# Patient Record
Sex: FEMALE | Race: WHITE | Employment: FULL TIME | URBAN - METROPOLITAN AREA
[De-identification: names, ages, dates, MRNs, and addresses within clinical notes are randomized per-mention and may not be internally consistent; named-entity substitution may affect disease eponyms.]

---

## 2022-08-12 ENCOUNTER — APPOINTMENT (OUTPATIENT)
Dept: CT IMAGING | Age: 47
End: 2022-08-12
Payer: COMMERCIAL

## 2022-08-12 ENCOUNTER — HOSPITAL ENCOUNTER (EMERGENCY)
Age: 47
Discharge: HOME | End: 2022-08-12
Attending: EMERGENCY MEDICINE
Payer: COMMERCIAL

## 2022-08-12 VITALS
RESPIRATION RATE: 17 BRPM | BODY MASS INDEX: 28.04 KG/M2 | HEART RATE: 84 BPM | DIASTOLIC BLOOD PRESSURE: 79 MMHG | SYSTOLIC BLOOD PRESSURE: 117 MMHG | OXYGEN SATURATION: 97 % | TEMPERATURE: 98 F | WEIGHT: 185 LBS | HEIGHT: 68 IN

## 2022-08-12 DIAGNOSIS — R51.9 ACUTE NONINTRACTABLE HEADACHE, UNSPECIFIED HEADACHE TYPE: Primary | ICD-10-CM

## 2022-08-12 LAB
ABSOLUTE EOS #: 0 K/UL (ref 0–0.4)
ABSOLUTE LYMPH #: 1.9 K/UL (ref 1–4.8)
ABSOLUTE MONO #: 0.7 K/UL (ref 0.1–1.3)
ANION GAP SERPL CALCULATED.3IONS-SCNC: 9 MMOL/L (ref 9–17)
BASOPHILS # BLD: 0 % (ref 0–2)
BASOPHILS ABSOLUTE: 0 K/UL (ref 0–0.2)
BILIRUBIN URINE: NEGATIVE
BUN BLDV-MCNC: 15 MG/DL (ref 6–20)
CALCIUM SERPL-MCNC: 9.5 MG/DL (ref 8.6–10.4)
CHLORIDE BLD-SCNC: 103 MMOL/L (ref 98–107)
CO2: 26 MMOL/L (ref 20–31)
COLOR: YELLOW
COMMENT UA: NORMAL
CREAT SERPL-MCNC: 0.67 MG/DL (ref 0.5–0.9)
EOSINOPHILS RELATIVE PERCENT: 0 % (ref 0–4)
GFR AFRICAN AMERICAN: >60 ML/MIN
GFR NON-AFRICAN AMERICAN: >60 ML/MIN
GFR SERPL CREATININE-BSD FRML MDRD: ABNORMAL ML/MIN/{1.73_M2}
GLUCOSE BLD-MCNC: 128 MG/DL (ref 70–99)
GLUCOSE URINE: NEGATIVE
HCT VFR BLD CALC: 37.4 % (ref 36–46)
HEMOGLOBIN: 12.6 G/DL (ref 12–16)
KETONES, URINE: NEGATIVE
LEUKOCYTE ESTERASE, URINE: NEGATIVE
LYMPHOCYTES # BLD: 17 % (ref 24–44)
MAGNESIUM: 2 MG/DL (ref 1.6–2.6)
MCH RBC QN AUTO: 32.1 PG (ref 26–34)
MCHC RBC AUTO-ENTMCNC: 33.8 G/DL (ref 31–37)
MCV RBC AUTO: 95.1 FL (ref 80–100)
MONOCYTES # BLD: 6 % (ref 1–7)
NITRITE, URINE: NEGATIVE
PDW BLD-RTO: 14.7 % (ref 11.5–14.9)
PH UA: 6 (ref 5–8)
PLATELET # BLD: 225 K/UL (ref 150–450)
PMV BLD AUTO: 7.3 FL (ref 6–12)
POTASSIUM SERPL-SCNC: 4.1 MMOL/L (ref 3.7–5.3)
PROTEIN UA: NEGATIVE
RBC # BLD: 3.93 M/UL (ref 4–5.2)
SEG NEUTROPHILS: 77 % (ref 36–66)
SEGMENTED NEUTROPHILS ABSOLUTE COUNT: 9 K/UL (ref 1.3–9.1)
SODIUM BLD-SCNC: 138 MMOL/L (ref 135–144)
SPECIFIC GRAVITY UA: 1.01 (ref 1–1.03)
TURBIDITY: CLEAR
URINE HGB: NEGATIVE
UROBILINOGEN, URINE: NORMAL
WBC # BLD: 11.7 K/UL (ref 3.5–11)

## 2022-08-12 PROCEDURE — 2580000003 HC RX 258: Performed by: EMERGENCY MEDICINE

## 2022-08-12 PROCEDURE — 6360000002 HC RX W HCPCS: Performed by: EMERGENCY MEDICINE

## 2022-08-12 PROCEDURE — 36415 COLL VENOUS BLD VENIPUNCTURE: CPT

## 2022-08-12 PROCEDURE — 81003 URINALYSIS AUTO W/O SCOPE: CPT

## 2022-08-12 PROCEDURE — 83735 ASSAY OF MAGNESIUM: CPT

## 2022-08-12 PROCEDURE — 96374 THER/PROPH/DIAG INJ IV PUSH: CPT

## 2022-08-12 PROCEDURE — 99284 EMERGENCY DEPT VISIT MOD MDM: CPT

## 2022-08-12 PROCEDURE — 80048 BASIC METABOLIC PNL TOTAL CA: CPT

## 2022-08-12 PROCEDURE — 85025 COMPLETE CBC W/AUTO DIFF WBC: CPT

## 2022-08-12 PROCEDURE — 70450 CT HEAD/BRAIN W/O DYE: CPT

## 2022-08-12 RX ORDER — METOCLOPRAMIDE HYDROCHLORIDE 5 MG/ML
10 INJECTION INTRAMUSCULAR; INTRAVENOUS ONCE
Status: COMPLETED | OUTPATIENT
Start: 2022-08-12 | End: 2022-08-12

## 2022-08-12 RX ORDER — DIPHENHYDRAMINE HYDROCHLORIDE 50 MG/ML
25 INJECTION INTRAMUSCULAR; INTRAVENOUS EVERY 6 HOURS PRN
Status: DISCONTINUED | OUTPATIENT
Start: 2022-08-12 | End: 2022-08-12 | Stop reason: HOSPADM

## 2022-08-12 RX ORDER — 0.9 % SODIUM CHLORIDE 0.9 %
1000 INTRAVENOUS SOLUTION INTRAVENOUS ONCE
Status: COMPLETED | OUTPATIENT
Start: 2022-08-12 | End: 2022-08-12

## 2022-08-12 RX ADMIN — SODIUM CHLORIDE 1000 ML: 9 INJECTION, SOLUTION INTRAVENOUS at 13:01

## 2022-08-12 RX ADMIN — METOCLOPRAMIDE HYDROCHLORIDE 10 MG: 5 INJECTION INTRAMUSCULAR; INTRAVENOUS at 13:10

## 2022-08-12 ASSESSMENT — ENCOUNTER SYMPTOMS
VOMITING: 0
RHINORRHEA: 0
EYE REDNESS: 0
SINUS PRESSURE: 0
COLOR CHANGE: 0
BACK PAIN: 0
WHEEZING: 0
CHEST TIGHTNESS: 0
FACIAL SWELLING: 0
COUGH: 0
SHORTNESS OF BREATH: 0
TROUBLE SWALLOWING: 0
SORE THROAT: 0
NAUSEA: 0
DIARRHEA: 0
ABDOMINAL PAIN: 0
EYE PAIN: 0
EYE DISCHARGE: 0
CONSTIPATION: 0
BLOOD IN STOOL: 0

## 2022-08-12 ASSESSMENT — PAIN - FUNCTIONAL ASSESSMENT
PAIN_FUNCTIONAL_ASSESSMENT: NONE - DENIES PAIN
PAIN_FUNCTIONAL_ASSESSMENT: NONE - DENIES PAIN

## 2022-08-12 NOTE — ED PROVIDER NOTES
Negative for abdominal pain, blood in stool, constipation, diarrhea, nausea and vomiting. Genitourinary:  Negative for difficulty urinating, dysuria, flank pain, frequency, genital sores and hematuria. Musculoskeletal:  Negative for arthralgias, back pain, gait problem, joint swelling, myalgias and neck pain. Skin:  Positive for wound. Negative for color change, pallor and rash. Neurological:  Positive for headaches. Negative for dizziness, tremors, seizures, syncope, speech difficulty, weakness and numbness. Psychiatric/Behavioral:  Negative for confusion, decreased concentration, hallucinations, self-injury, sleep disturbance and suicidal ideas. PAST MEDICAL HISTORY   No past medical history on file. SURGICAL HISTORY     No past surgical history on file. CURRENT MEDICATIONS       Previous Medications    No medications on file       ALLERGIES     has No Known Allergies. SOCIAL HISTORY          PHYSICAL EXAM     INITIAL VITALS: /79   Pulse 84   Temp 98 °F (36.7 °C) (Oral)   Resp 17   Ht 5' 8\" (1.727 m)   Wt 185 lb (83.9 kg)   SpO2 97%   BMI 28.13 kg/m²      Physical Exam  Vitals and nursing note reviewed. Constitutional:       General: She is not in acute distress. Appearance: She is well-developed. She is not diaphoretic. HENT:      Head: Normocephalic and atraumatic. Comments: Incision behind the left ear is swollen and edematous but no erythema no discharge  Eyes:      General: No scleral icterus. Right eye: No discharge. Left eye: No discharge. Conjunctiva/sclera: Conjunctivae normal.      Pupils: Pupils are equal, round, and reactive to light. Cardiovascular:      Rate and Rhythm: Normal rate and regular rhythm. Heart sounds: Normal heart sounds. No murmur heard. No friction rub. No gallop. Pulmonary:      Effort: Pulmonary effort is normal. No respiratory distress. Breath sounds: Normal breath sounds. No wheezing or rales. Chest:      Chest wall: No tenderness. Abdominal:      General: Bowel sounds are normal. There is no distension. Palpations: Abdomen is soft. There is no mass. Tenderness: There is no abdominal tenderness. There is no guarding or rebound. Musculoskeletal:         General: No tenderness. Normal range of motion. Skin:     General: Skin is warm and dry. Coloration: Skin is not pale. Findings: No erythema or rash. Neurological:      Mental Status: She is alert and oriented to person, place, and time. Cranial Nerves: No cranial nerve deficit. Sensory: No sensory deficit. Motor: No weakness or abnormal muscle tone. Coordination: Coordination normal.      Deep Tendon Reflexes: Reflexes normal.   Psychiatric:         Behavior: Behavior normal.         Thought Content: Thought content normal.         Judgment: Judgment normal.       DIAGNOSTIC RESULTS     RADIOLOGY:All plain film, CT,MRI, and formal ultrasound images (except ED bedside ultrasound) are read by the radiologist and the interpretations are directly viewed by the emergency physician. CT HEAD WO CONTRAST    Result Date: 8/12/2022  EXAMINATION: CT OF THE HEAD WITHOUT CONTRAST  8/12/2022 1:35 pm TECHNIQUE: CT of the head was performed without the administration of intravenous contrast. Automated exposure control, iterative reconstruction, and/or weight based adjustment of the mA/kV was utilized to reduce the radiation dose to as low as reasonably achievable. COMPARISON: None.  HISTORY: ORDERING SYSTEM PROVIDED HISTORY: Post op HA and incision swelling TECHNOLOGIST PROVIDED HISTORY: Post op HA and incision swelling Decision Support Exception - unselect if not a suspected or confirmed emergency medical condition->Emergency Medical Condition (MA) Is the patient pregnant?->No Reason for Exam: Post op HA and incision swelling Additional signs and symptoms: post left oracle swelling x1 month post op craniotomy, elevated BP Relevant Medical/Surgical History: hx of benign brain tumor FINDINGS: BRAIN/VENTRICLES: There is no acute intracranial hemorrhage, mass effect or midline shift. There is fluid seen adjacent to the left cerebellar hemisphere in the posterior fossa. The gray-white differentiation is maintained without evidence of an acute infarct. There is no evidence of hydrocephalus. ORBITS: The visualized portion of the orbits demonstrate no acute abnormality. SINUSES: The visualized paranasal sinuses and mastoid air cells demonstrate no acute abnormality. SOFT TISSUES/SKULL:  Fluid attenuation seen in the posterior soft tissues overlying the left occipital craniotomy site. The collection appears to measure proximally 4 cm in greatest diameter. Fluid seen adjacent to the left cerebellar hemisphere in the left posterior fossa underlying left occipital craniotomy. In the soft tissues overlying the left occipital craniotomy there is a fluid collection measuring approximately 4 cm in greatest diameter. An infected fluid collection could give this appearance. LABS: All lab results were reviewed by myself, and all abnormals are listed below. Labs Reviewed   BASIC METABOLIC PANEL - Abnormal; Notable for the following components:       Result Value    Glucose 128 (*)     All other components within normal limits   CBC WITH AUTO DIFFERENTIAL - Abnormal; Notable for the following components:    WBC 11.7 (*)     RBC 3.93 (*)     Seg Neutrophils 77 (*)     Lymphocytes 17 (*)     All other components within normal limits   URINALYSIS WITH REFLEX TO CULTURE   MAGNESIUM         MEDICAL DECISION MAKING:     I believe that there is just some edema around the incision though I do not see any signs of fluctuance. We will get a CT scan of the head both for the headaches since its postoperative and also to take a look at that area of the scalp.   Also do some basic blood work and get her some fluids and something to help with headache. EMERGENCY DEPARTMENT COURSE:   Vitals:    Vitals:    08/12/22 1225 08/12/22 1344 08/12/22 1401   BP: 133/71 120/81 117/79   Pulse: 83 84    Resp: 18 17    Temp: 98 °F (36.7 °C)     TempSrc: Oral     SpO2: 98% 98% 97%   Weight: 185 lb (83.9 kg)     Height: 5' 8\" (1.727 m)         The patient was given the following medications while in the emergency department:  Orders Placed This Encounter   Medications    0.9 % sodium chloride bolus    metoclopramide (REGLAN) injection 10 mg    diphenhydrAMINE (BENADRYL) injection 25 mg       -------------------------  3:54 PM EDT  Patient was updated on results. I did discuss the case with the neurosurgery resident who is familiar with this patient and they think most likely the fluid collection is just a cephalomeningocele and they do not believe that this is related to infection since the patient is nontoxic and vital signs are normal.  They recommend following up next week in the office which she is going to have them set up. Patient is okay with this plan. CONSULTS:  None    PROCEDURES:  None    FINAL IMPRESSION      1.  Acute nonintractable headache, unspecified headache type          DISPOSITION/PLAN   DISPOSITION Decision To Discharge 08/12/2022 03:53:31 PM      PATIENT REFERREDTO:  [address] [place] [address] Eliana Swanson Bhavna 1999  [phone]    In 1 week      [facility] ED  [place] [address]  [phone]    If symptoms worsen    DISCHARGEMEDICATIONS:  New Prescriptions    No medications on file       (Please note that portions of this note were completed with a voice recognition program.  Efforts were made to edit thedictations but occasionally words are mis-transcribed.)    Ashlee Hurd MD  Attending Emergency Physician                        Ashlee Hurd MD  08/12/22 [id number]

## 2022-08-12 NOTE — ED TRIAGE NOTES
Mode of arrival (squad #, walk in, police, etc) : walk-in        Chief complaint(s): post-op swelling, headache        Arrival Note (brief scenario, treatment PTA, etc). : Patient reports craniotomy for tumor removal x1 month ago. Patient reports increased swelling at the procedure site x1 week. Patient reports a slight headache and pain radiating into the left side of the neck. Patient reports having left sided facial paralysis and hearing loss of the left ear post procedure. C= \"Have you ever felt that you should Cut down on your drinking? \"  No  A= \"Have people Annoyed you by criticizing your drinking? \"  No  G= \"Have you ever felt bad or Guilty about your drinking? \"  No  E= \"Have you ever had a drink as an Eye-opener first thing in the morning to steady your nerves or to help a hangover? \"  No      Deferred []      Reason for deferring: N/A    *If yes to two or more: probable alcohol abuse. *

## 2023-06-30 ENCOUNTER — HOSPITAL ENCOUNTER (OUTPATIENT)
Dept: DATA CONVERSION | Facility: HOSPITAL | Age: 48
End: 2023-06-30
Attending: OTOLARYNGOLOGY | Admitting: OTOLARYNGOLOGY
Payer: COMMERCIAL
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
## 2023-06-30 DIAGNOSIS — G51.0 BELL'S PALSY: ICD-10-CM

## 2023-06-30 DIAGNOSIS — K13.1 CHEEK AND LIP BITING: ICD-10-CM

## 2023-06-30 DIAGNOSIS — H57.813 BROW PTOSIS, BILATERAL: ICD-10-CM

## 2023-06-30 DIAGNOSIS — H02.834 DERMATOCHALASIS OF LEFT UPPER EYELID: ICD-10-CM

## 2023-06-30 DIAGNOSIS — G24.4 IDIOPATHIC OROFACIAL DYSTONIA: ICD-10-CM

## 2023-06-30 DIAGNOSIS — E66.9 OBESITY, UNSPECIFIED: ICD-10-CM

## 2023-06-30 DIAGNOSIS — H02.831 DERMATOCHALASIS OF RIGHT UPPER EYELID: ICD-10-CM

## 2023-06-30 DIAGNOSIS — K21.9 GASTRO-ESOPHAGEAL REFLUX DISEASE WITHOUT ESOPHAGITIS: ICD-10-CM

## 2023-06-30 LAB
ABO GROUP (TYPE) IN BLOOD: NORMAL
ANTIBODY SCREEN: NORMAL
PATIENT TEMPERATURE: 37 DEGREES C
POCT ANION GAP, ARTERIAL: 10 MMOL/L (ref 10–25)
POCT BASE EXCESS, ARTERIAL: -1.8 MMOL/L (ref -2–3)
POCT CHLORIDE, ARTERIAL: 107 MMOL/L (ref 98–107)
POCT GLUCOSE, ARTERIAL: 160 MG/DL (ref 74–99)
POCT HCO3, ARTERIAL: 23.1 MMOL/L (ref 22–26)
POCT HEMATOCRIT, ARTERIAL: 31 % (ref 36–46)
POCT HEMOGLOBIN, ARTERIAL: 10.4 G/DL (ref 12–16)
POCT IONIZED CALCIUM, ARTERIAL: 1.05 MMOL/L (ref 1.1–1.33)
POCT LACTATE, ARTERIAL: 1.5 MMOL/L (ref 0.4–2)
POCT OXY HEMOGLOBIN, ARTERIAL: 96.4 % (ref 94–98)
POCT PCO2, ARTERIAL: 39 MMHG (ref 38–42)
POCT PH, ARTERIAL: 7.38 (ref 7.38–7.42)
POCT PO2, ARTERIAL: 133 MMHG (ref 85–95)
POCT POTASSIUM, ARTERIAL: 3.6 MMOL/L (ref 3.5–5.3)
POCT SO2, ARTERIAL: 100 % (ref 94–100)
POCT SODIUM, ARTERIAL: 136 MMOL/L (ref 136–145)
RH FACTOR: NORMAL

## 2023-09-29 VITALS
RESPIRATION RATE: 16 BRPM | WEIGHT: 182.1 LBS | HEIGHT: 67 IN | TEMPERATURE: 97.5 F | SYSTOLIC BLOOD PRESSURE: 120 MMHG | HEART RATE: 68 BPM | BODY MASS INDEX: 28.58 KG/M2 | DIASTOLIC BLOOD PRESSURE: 71 MMHG

## 2023-09-30 NOTE — H&P
History of Present Illness:   Pregnant/Lactating:  ·  Are You Pregnant no   ·  Are You Currently Breastfeeding no     History Present Illness:  Reason for surgery: facial paralysis, brow ptosis   HPI:    HPI: No significant changes to the medical history since last H/P    PMH: reviewed in chart   Fam Hx: negative for bleeding disorders  Social Hx: Reviewed in EMR  Allergies: NKDA  ROS: negative except as above in HPI  Physical Exam:  Gen- NAD  Resp- nonlabored on RA, symmetric chest rise  Head/Face- NCAT, no masses or lesions  Eyes- EOMI, clear sclera  Ears- normal external ears, no gross lesions of EACs  Nose- anterior nares clear, no bleeding or drainage  Mouth- lips without lesions, no excessive drooling  Neuro- alert and interactive    Medications, imaging and pertinent labs reviewed in EMR    A/P  Proceed with planned surgery.    Allergies:        Allergies:  ·  No Known Allergies :     Home Medication Review:   Home Medications Reviewed: yes     Impression/Procedure:   ·  Impression and Planned Procedure: facial paralysis, brow ptosis  blepharoplasty, brow lift, digastric muscle transfer with CFNG       ERAS (Enhanced Recovery After Surgery):  ·  ERAS Patient: no       Vital Signs:  Temperature C: 36.4 degrees C   Temperature F: 97.5 degrees F   Heart Rate: 68 beats per minute   Respiratory Rate: 16 breath per minute   Blood Pressure Systolic: 120 mm/Hg   Blood Pressure Diastolic: 71 mm/Hg     Physical Exam by System:    Respiratory/Thorax: Patent airways, normal breath  sounds with good chest expansion, thorax symmetric   Cardiovascular: Regular, rate and rhythm, 2+ equal  pulses of the extremities     Consent:   COVID-19 Consent:  ·  COVID-19 Risk Consent Surgeon has reviewed key risks related to the risk of radha COVID-19 and if they contract COVID-19 what the risks are.     Attestation:   Note Completion:  I am a:  Resident/Fellow   Attending Attestation I saw and evaluated the patient.  I  personally obtained the key and critical portions of the history and physical exam or was physically present for key and  critical portions performed by the resident/fellow. I reviewed the resident/fellow?s documentation and discussed the patient with the resident/fellow.  I agree with the resident/fellow?s medical decision making as documented in the note.     I personally evaluated the patient on 30-Jun-2023         Electronic Signatures:  Wilfred Muñoz)  (Signed 30-Jun-2023 15:00)   Authored: Physical Exam, Note Completion   Co-Signer: History of Present Illness, Allergies, Home Medication Review, Impression/Procedure, ERAS, Physical Exam, Consent, Note Completion  Jace Perez (Resident))  (Signed 30-Jun-2023 06:34)   Authored: History of Present Illness, Allergies, Home  Medication Review, Impression/Procedure, ERAS, Physical Exam, Consent, Note Completion      Last Updated: 30-Jun-2023 15:00 by Wilfred Muñoz)

## 2023-10-02 NOTE — OP NOTE
PROCEDURE DETAILS    Preoperative Diagnosis:  facial paralysis  oral competency, lip biting  oral dyskinesia  speech dysarthria  brow ptosis bilaterally  dermatochalasis  Postoperative Diagnosis:  same  Surgeon: Wilfred Muñoz  Resident/Fellow/Other Assistant: None of these were associated with this case    Procedure:  1.  BILATERAL BROW LIFT, BLEPHAROPLASTY, DIGASTRIC TRANSFER, SECOND STAGE CROSS FACE NERVE GRAFT    Anesthesia: David Castaneda  Estimated Blood Loss: 10 ml  Findings: digastric muscle transfer  2nd stage CFNG  browlift + bleph        Operative Report:   INDICATIONS:   Patient is a pleasant female with left facial paralysis.  Options for reanimation were discussed including static and dynamic procedures.  The risks of digastirc muscle transfer for oral kinesia restoration were discussed including bleeding, infection,  partial or complete flap failure, return to the operating room, poor cosmesis, implant exposure or extrusion or infection, wound infection, and anesthetic risk were fully explained as well as the benefits and alternatives to the procedure.  The patient  expressed understanding and wishes to proceed.  Informed consent signed.     PROCEDURE DETAILS:  The patient was brought to the operating room and placed supine on the table.   Identity was confirmed.  General anesthesia was induced and patient was nasotracheally intubated with a cuffed endotracheal tube. A time-out was performed.  The operating  table was rotated 90 degrees. SCD boots were placed and the arms protected and tucked at the sides.     The face and neck were prepped and draped in the usual sterile fashion. The eyes were protected after basic saline solution irrigation and placement of steri-strips.  Pre-operative markings of facial landmarks were created in the pre-op area prior to  starting the procedure.    We began with the identification of the diagstirc muscle. A 2.5 cm horizontal neck incision was made with  dissection carried through previous scar and cervical fascia to identify the digastric muscle belly. The previous cross face nerve graft was identified  as well for second stage nerve transfer and neurotization of the digastric muscle upon transfer. This nerve was dissected distally and proximally for transposition of the nerve. The digastric muscle was circumferentially dissected with the proximal portion  of the muscle belly followed to the tendonous sheath. The tendon was released from the hyoid bone and lifted along the deep portion of the muscle from posterior to anterior. The neurovascular pedicle was identified and preserved. The nerve to the digastric  was stimulated revealing favorable movement of the digastric muscle belly suitable for transposition. Upon adequate dissection and mobilization of the digastric muscle belly, the nerve to the digastric was lysed and the site was prepared for neurotization  and coaptation.    The operative microscope for microsurgical technique was used for coaptation of the digastric nerve with the cross face nerve graft.  Excess viable cross face nerve graft was splayed open with opening of the imani and epineurium and fascicle isolation  for direct neurotization into the muscle belly. These were brought into end-to-end alignment such that there was no tension on the anastomosis and secured with several 8-0 sutures. The fascicles were embedded into the muscle belly. Neuroshield chitosan  membrane was wrapped around to the coaptation site for allograft conduit.  A tunnel was created from the mucosal lip to the neck wound. At this point, the distal end of the flap was inset at the orbicular oris with PDS and monocryl sutures. Bipolar hemostasis  was achieved and a JAIRO drain was placed into the wound bed. The incisions were closed at the lip and neck with monocryl and gut suture. Ointment was applied at these sites.    Attention was turned to the bilateral upper eyelid for  blepharoplasty. The following was performed bilaterally. Excess skin marked pre-operatively with the patient awake was sharply cut and dissected and subsequently discarded. Deep to this skin, a slit  of orbicular oculi was excised. Hemostasis was achieved with bipolar cautery. The medial fat compartment was identified and dissected, the fat was exposed and released for resection. A small portion of fat was resected using bipolar cautery. The incisions  were then closed with fast gut suture.    Next, we proceeded with a bilateral open brow approach for bilateral brow ptosis. The forehead was injected with 1% lidocaine with epinephrine in  a 1:100,000 dilution.  An incision was created using a 15-blade scalpel through the skin of the brow which was hidden slightly behind the hairline. A supra-galeal plane of dissection was then used to elevate the skin of the forehead down to a level just  below the superior orbital rim.  The brow was lifted to the desired height using both PDS. Hemostasis was obtained and the skin closed with deep monocryl sutures and a running nylon suture.  A 1.2 cm of redundant skin was excised prior to closure.   The forehead was then protected with a xeroform dressing and the head was wrapped with a kerlex dressing. Care was taken to allow two fingers to be passed under the wrap in order to prevent tissue necrosis of the forehead skin.                        Attestation:   Note Completion:  Attending Attestation I was present for the entire procedure         Electronic Signatures:  Wilfred Muñoz)  (Signed 03-Jul-2023 22:55)   Authored: Post-Operative Note, Chart Review, Note Completion      Last Updated: 03-Jul-2023 22:55 by Wilfred Muñoz)

## 2024-05-09 ENCOUNTER — APPOINTMENT (OUTPATIENT)
Dept: OTOLARYNGOLOGY | Facility: CLINIC | Age: 49
End: 2024-05-09
Payer: COMMERCIAL

## 2024-08-15 ENCOUNTER — APPOINTMENT (OUTPATIENT)
Dept: OTOLARYNGOLOGY | Facility: CLINIC | Age: 49
End: 2024-08-15
Payer: COMMERCIAL

## 2024-08-15 ENCOUNTER — OFFICE VISIT (OUTPATIENT)
Dept: OTOLARYNGOLOGY | Facility: CLINIC | Age: 49
End: 2024-08-15
Payer: COMMERCIAL

## 2024-08-15 VITALS — BODY MASS INDEX: 28.26 KG/M2 | WEIGHT: 186.5 LBS | HEIGHT: 68 IN

## 2024-08-15 DIAGNOSIS — G24.5 BLEPHAROSPASM: ICD-10-CM

## 2024-08-15 DIAGNOSIS — G51.9 ABNORMALITY OF FACIAL NERVE: Primary | ICD-10-CM

## 2024-08-15 DIAGNOSIS — R25.8 SYNKINESIA: ICD-10-CM

## 2024-08-15 PROCEDURE — 99214 OFFICE O/P EST MOD 30 MIN: CPT | Performed by: OTOLARYNGOLOGY

## 2024-08-15 PROCEDURE — 3008F BODY MASS INDEX DOCD: CPT | Performed by: OTOLARYNGOLOGY

## 2024-08-15 RX ORDER — PANTOPRAZOLE SODIUM 40 MG/1
40 TABLET, DELAYED RELEASE ORAL DAILY
COMMUNITY

## 2024-08-15 RX ORDER — SERTRALINE HYDROCHLORIDE 50 MG/1
50 TABLET, FILM COATED ORAL DAILY
COMMUNITY

## 2024-08-15 ASSESSMENT — PAIN SCALES - GENERAL: PAINLEVEL: 0-NO PAIN

## 2024-08-15 NOTE — PROGRESS NOTES
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     Complains of scalp sensitivity and itchiness, TTP of left occipital scalp.   Complains of left sided lip / trouble eating, left eyelid and cheek tightness.     Last botox was done on 9/15/23; did not return because wanted to see what her face would look like without the botox.  Was still trying to grasp that her face does not look the same.     Presents today for FUV     Assessment: This patient has left incomplete facial paralysis. There is a profound physical deformity, with asymmetry of the face. Function is compromised with oral incompetence, air escape, difficulty communicating with others, eye irritation, and visual field obstruction.     Plan: We discussed options for treatment, including both injectable treatments, facial retraining, and surgical interventions. Each of these is is medically necessary.     Treatment plan:     chemodenervation to continue,  we discussed and encouraged more mental health support and wellbeing  We also discussed possible cross face nerve graft and gracilis muscle neurovascular flap to improve dynamic movement on the left     Today, I was in direct face-to-face consultation with the patient, of which greater than 50% of the time was spent discussing the diagnoses, treatment plan, counseling, and care coordination. The patient and/or caregiver voiced understanding of our conversation and all questions were satisfactorily answered.    Total time for this visit: 30 min     Recall at previous visits:  continues to have symptoms related to synkinesia and dyskinesia with facial tightness and spasms  here for follow up after chemodenervation      7.14.23  s/p bilateral  brow lift, blepharoplasty, left digastric muscle transfer     left neck soft, no fluctuance  incisions c/d/i  sutures at brow removed     continue ointment to incisions  RTC 2 months     IH 5.26.23 improved facial symmetry still bothered by lack of lower lip retraction on the left     IH 3.24.23 Returns for post operative monitoring and adjunctive trial of chemodenervation. doing well. results of the chemodenervation were reviewed. she feels symmetry is improved. would like improved brow symmetry and less brow ptosis, left lower lip with asymmetry still that is bothersome to speech     IH 2.27.23 here for stape removal     IH 2.21.23     s/p left selective neuromyectomy, CFNG for possible digastric transfer     pt with improved excursion and oral commissure position  staples intact  brow in more appropriate position     consideration for blepharoplasty, brow lift given improved visual fields with brow elevation and eyelid taping. plan for bilateral brow lift, blepharoplasty, digastric muscle transfer to lower lip with CFNG innervation     IH 2.6.23     Now with significant facial hyperkinesis, synkinesis  Undesired facial movement with facial dystonia     She is significantly devastated by the current facial movement and incomplete facial recovery  She underwent physical therapy with electrical stimulation     describes bilateral visual field obstruction due to excess skin and left brow ptosis     Chief Complaint: Facial paralysis  Referring Provider: Dr. Vang and Dr. Gonzalez     HPI: KAYCE ROSE is a 46 year Non- or  female who was referred for evaluation of facial paralysis.   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 Pertinent  items are noted in HPI. She denies otalgia, odynophagia, dysphagia, dysarthria, voice changes, hemoptysis, or weight loss.     Past, family, and social history obtained but not pertinent to current problem.     Physical Exam     General: Well-developed and well-nourished in appearance.  Skin: No rashes or concerning lesions on the visible portions of the skin.  Eyes: Extraocular movements intact. Visual fields grossly normal.  Ears: Pinna are normal in shape and position. External canals are patent.  Nose: Dorsum is midline. Septum is midline and turbinates are normal on anterior  rhinoscopy.  Oral Cavity/Oropharynx: Dentition is intact. Mucous membranes moist. No masses or  lesions. Tonsils are symmetric and non-obstructive.  Neck: Midline trachea without masses or lesions. Thyroid is normal in size.  Lymphatics: No palpable cervical lymphadenopathy  Respiratory: No respiratory distress. Quiet breathing without stertor or stridor.  Cardiovascular: Regular rate and rhythm. Warm extremities with equal pulses.  Psych: Normal mood and affect. Judgement and insight appropriate.  Neuro: Alert and oriented. CN II-XII grossly intact. No focal deficits.  Musculoskeletal: Gait intact. Moves all extremities well without apparent deformities.     A comprehensive facial examination was performed with the following highlights:  HB: 6  Appearance at rest: flaccid paralysis     Upper third exam:            Brow: ptosis present            Upper eyelid: left eyelid weight present, complete closure            Lower eyelid: hyperkinetic, synkinesis            Corneal exam: healthy            Mcclusky phenomenon:      Middle third exam:            NL fold: accentuated on the left            Oral commissure: asymmetry            Lower lip: symmetric

## 2024-08-22 ENCOUNTER — APPOINTMENT (OUTPATIENT)
Dept: OTOLARYNGOLOGY | Facility: CLINIC | Age: 49
End: 2024-08-22
Payer: COMMERCIAL

## 2024-08-28 DIAGNOSIS — L02.01 ABSCESS OF CHIN: ICD-10-CM

## 2024-08-28 RX ORDER — SULFAMETHOXAZOLE AND TRIMETHOPRIM 400; 80 MG/1; MG/1
1 TABLET ORAL 2 TIMES DAILY
Qty: 14 TABLET | Refills: 0 | Status: SHIPPED | OUTPATIENT
Start: 2024-08-28 | End: 2024-09-04

## 2024-09-11 PROBLEM — R25.8 SYNKINESIA: Status: ACTIVE | Noted: 2024-09-11

## 2024-09-11 PROBLEM — G51.9 ABNORMALITY OF FACIAL NERVE: Status: ACTIVE | Noted: 2024-09-11

## 2024-09-11 PROBLEM — G24.5 BLEPHAROSPASM: Status: ACTIVE | Noted: 2024-09-11

## 2024-09-11 PROBLEM — G51.0 FACIAL PARALYSIS: Status: ACTIVE | Noted: 2024-09-11

## 2024-09-11 NOTE — PROGRESS NOTES
Facial Plastic & Reconstructive Surgery    9/12/24  INDICATIONS AND CONSENT  Bailey Zamora presents with facial synkinesis and dyskinesia. The patient was offered the above procedures and after the risks, benefits, alternatives, and limitations to the procedure were discussed, the patient elected to proceed.  Risks discussed included but were not limited to bruising, lack of effect, ptosis, or an undesirable outcome including contour irregularities.  Informed consent was obtained.     DESCRIPTION OF PROCEDURE:  Using sterile technique, the injections were performed as follows:     Botox was injected into the frontalis bl, corrugators bl, mentalis bl, left digastric, right platysma, left orbicularis oculi, right orbicularis oris.  A total of 60 units was injected by Dr. Muñoz.     The precise locations and amounts were recorded on the facial map.  The patient tolerated the procedure well without any complications.       Assessment: This patient has left sided incomplete facial paralysis. There is a profound physical deformity, with asymmetry of the face. Function is compromised with oral incompetence, air escape, difficulty communicating with others and visual field obstruction.    Plan: We discussed options for treatment, including both injectable treatments, facial retraining, and surgical interventions. Each of these is is medically necessary.    Treatment plan: we discussed first stage dual innervated cross face nerve graft (5+7) with second stage gracilis free flap to address residual oral incompetence and paralysis on the left side. 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     Complains of scalp sensitivity and itchiness, TTP of left occipital scalp.   Complains of left sided lip / trouble eating, left eyelid and cheek tightness.     Last botox was done on 9/15/23; did not return because wanted to see what her face would look like without the botox.  Was still trying to grasp that her face does not look the same.     Presents today for FUV     Assessment: This patient has left incomplete facial paralysis. There is a profound physical deformity, with asymmetry of the face. Function is compromised with oral incompetence, air escape, difficulty communicating with others, eye irritation, and visual field obstruction.     Plan: We discussed options for treatment, including both injectable treatments, facial retraining, and surgical interventions. Each of these is is medically necessary.     Treatment plan:     chemodenervation to continue,  we discussed and encouraged more mental health support and wellbeing  We also discussed possible cross face nerve graft and gracilis muscle neurovascular flap to improve dynamic movement on the left     Today, I was in direct face-to-face consultation with the patient, of which greater than 50% of the time was spent discussing the diagnoses, treatment plan, counseling, and care coordination. The patient and/or caregiver voiced understanding of our conversation and all questions were satisfactorily answered.    Total time for this visit: 30 min     Recall at previous visits:  continues to have symptoms related to synkinesia and dyskinesia with facial tightness and spasms  here for follow up after chemodenervation     IH 7.14.23  s/p bilateral brow lift, blepharoplasty, left digastric muscle transfer     left neck soft, no fluctuance  incisions c/d/i  sutures at brow removed     continue ointment to incisions  RTC 2 months     IH 5.26.23 improved facial symmetry still bothered by lack of lower lip retraction on the left     IH 3.24.23  Returns for post operative monitoring and adjunctive trial of chemodenervation. doing well. results of the chemodenervation were reviewed. she feels symmetry is improved. would like improved brow symmetry and less brow ptosis, left lower lip with asymmetry still that is bothersome to speech     IH 2.27.23 here for stape removal     IH 2.21.23     s/p left selective neuromyectomy, CFNG for possible digastric transfer     pt with improved excursion and oral commissure position  staples intact  brow in more appropriate position     consideration for blepharoplasty, brow lift given improved visual fields with brow elevation and eyelid taping. plan for bilateral brow lift, blepharoplasty, digastric muscle transfer to lower lip with CFNG innervation     IH 2.6.23     Now with significant facial hyperkinesis, synkinesis  Undesired facial movement with facial dystonia     She is significantly devastated by the current facial movement and incomplete facial recovery  She underwent physical therapy with electrical stimulation     describes bilateral visual field obstruction due to excess skin and left brow ptosis     Chief Complaint: Facial paralysis  Referring Provider: Dr. Vang and Dr. Gonzalez     HPI: KAYCE ROSE is a 46 year Non- or  female who was referred for evaluation of facial paralysis.   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 Pertinent items are noted in HPI. She denies otalgia, odynophagia, dysphagia, dysarthria, voice changes, hemoptysis, or weight loss.     Past, family, and social history obtained but not pertinent to current problem.     Physical Exam     General: Well-developed and well-nourished in appearance.  Skin: No  rashes or concerning lesions on the visible portions of the skin.  Eyes: Extraocular movements intact. Visual fields grossly normal.  Ears: Pinna are normal in shape and position. External canals are patent.  Nose: Dorsum is midline. Septum is midline and turbinates are normal on anterior  rhinoscopy.  Oral Cavity/Oropharynx: Dentition is intact. Mucous membranes moist. No masses or  lesions. Tonsils are symmetric and non-obstructive.  Neck: Midline trachea without masses or lesions. Thyroid is normal in size.  Lymphatics: No palpable cervical lymphadenopathy  Respiratory: No respiratory distress. Quiet breathing without stertor or stridor.  Cardiovascular: Regular rate and rhythm. Warm extremities with equal pulses.  Psych: Normal mood and affect. Judgement and insight appropriate.  Neuro: Alert and oriented. CN II-XII grossly intact. No focal deficits.  Musculoskeletal: Gait intact. Moves all extremities well without apparent deformities.     A comprehensive facial examination was performed with the following highlights:  HB: 6  Appearance at rest: flaccid paralysis     Upper third exam:            Brow: ptosis present            Upper eyelid: left eyelid weight present, complete closure            Lower eyelid: hyperkinetic, synkinesis            Corneal exam: healthy            [place] phenomenon:      Middle third exam:            NL fold: accentuated on the left            Oral commissure: asymmetry            Lower lip: symmetric

## 2024-09-12 ENCOUNTER — APPOINTMENT (OUTPATIENT)
Dept: OTOLARYNGOLOGY | Facility: CLINIC | Age: 49
End: 2024-09-12
Payer: COMMERCIAL

## 2024-09-12 VITALS — WEIGHT: 185.5 LBS | BODY MASS INDEX: 28.11 KG/M2 | HEIGHT: 68 IN

## 2024-09-12 DIAGNOSIS — G51.0 FACIAL PARALYSIS: ICD-10-CM

## 2024-09-12 DIAGNOSIS — G51.9 ABNORMALITY OF FACIAL NERVE: Primary | ICD-10-CM

## 2024-09-12 DIAGNOSIS — R25.8 SYNKINESIA: ICD-10-CM

## 2024-09-12 DIAGNOSIS — G24.5 BLEPHAROSPASM: ICD-10-CM

## 2024-09-12 PROCEDURE — 64612 DESTROY NERVE FACE MUSCLE: CPT | Performed by: OTOLARYNGOLOGY

## 2024-09-12 ASSESSMENT — PATIENT HEALTH QUESTIONNAIRE - PHQ9
1. LITTLE INTEREST OR PLEASURE IN DOING THINGS: NOT AT ALL
2. FEELING DOWN, DEPRESSED OR HOPELESS: NOT AT ALL
SUM OF ALL RESPONSES TO PHQ9 QUESTIONS 1 AND 2: 0

## 2024-09-19 DIAGNOSIS — G51.9 ABNORMALITY OF FACIAL NERVE: ICD-10-CM

## 2024-09-19 DIAGNOSIS — R25.8 SYNKINESIA: ICD-10-CM

## 2024-09-19 DIAGNOSIS — G51.0 FACIAL PARALYSIS: ICD-10-CM

## 2024-09-19 DIAGNOSIS — G51.9 DISORDER OF SEVENTH CRANIAL NERVE: ICD-10-CM

## 2024-09-19 RX ORDER — GABAPENTIN 100 MG/1
100 CAPSULE ORAL NIGHTLY
Qty: 30 CAPSULE | Refills: 0 | Status: SHIPPED | OUTPATIENT
Start: 2024-09-19 | End: 2024-10-19

## 2024-11-07 ENCOUNTER — ANESTHESIA EVENT (OUTPATIENT)
Dept: OPERATING ROOM | Facility: CLINIC | Age: 49
End: 2024-11-07
Payer: COMMERCIAL

## 2024-11-19 ENCOUNTER — PHARMACY VISIT (OUTPATIENT)
Dept: PHARMACY | Facility: CLINIC | Age: 49
End: 2024-11-19
Payer: COMMERCIAL

## 2024-11-19 ENCOUNTER — HOSPITAL ENCOUNTER (OUTPATIENT)
Facility: CLINIC | Age: 49
Setting detail: OUTPATIENT SURGERY
Discharge: HOME | End: 2024-11-19
Attending: OTOLARYNGOLOGY | Admitting: OTOLARYNGOLOGY
Payer: COMMERCIAL

## 2024-11-19 VITALS
SYSTOLIC BLOOD PRESSURE: 116 MMHG | DIASTOLIC BLOOD PRESSURE: 65 MMHG | TEMPERATURE: 96.8 F | WEIGHT: 192.9 LBS | OXYGEN SATURATION: 95 % | HEART RATE: 97 BPM | BODY MASS INDEX: 29.33 KG/M2 | RESPIRATION RATE: 16 BRPM

## 2024-11-19 DIAGNOSIS — G51.0 FACIAL PARALYSIS: Primary | ICD-10-CM

## 2024-11-19 PROCEDURE — RXMED WILLOW AMBULATORY MEDICATION CHARGE

## 2024-11-19 PROCEDURE — 3700000001 HC GENERAL ANESTHESIA TIME - INITIAL BASE CHARGE: Performed by: OTOLARYNGOLOGY

## 2024-11-19 PROCEDURE — 3600000009 HC OR TIME - EACH INCREMENTAL 1 MINUTE - PROCEDURE LEVEL FOUR: Performed by: OTOLARYNGOLOGY

## 2024-11-19 PROCEDURE — 2720000007 HC OR 272 NO HCPCS: Performed by: OTOLARYNGOLOGY

## 2024-11-19 PROCEDURE — 2500000001 HC RX 250 WO HCPCS SELF ADMINISTERED DRUGS (ALT 637 FOR MEDICARE OP): Performed by: OTOLARYNGOLOGY

## 2024-11-19 PROCEDURE — 61605 RESECT/EXCISE CRANIAL LESION: CPT | Performed by: OTOLARYNGOLOGY

## 2024-11-19 PROCEDURE — 2500000005 HC RX 250 GENERAL PHARMACY W/O HCPCS: Performed by: OTOLARYNGOLOGY

## 2024-11-19 PROCEDURE — 2500000004 HC RX 250 GENERAL PHARMACY W/ HCPCS (ALT 636 FOR OP/ED): Performed by: OTOLARYNGOLOGY

## 2024-11-19 PROCEDURE — 7100000010 HC PHASE TWO TIME - EACH INCREMENTAL 1 MINUTE: Performed by: OTOLARYNGOLOGY

## 2024-11-19 PROCEDURE — 64716 REVISION OF CRANIAL NERVE: CPT | Performed by: OTOLARYNGOLOGY

## 2024-11-19 PROCEDURE — 64911 NEURORRAPHY W/VEIN AUTOGRAFT: CPT | Performed by: OTOLARYNGOLOGY

## 2024-11-19 PROCEDURE — 2500000005 HC RX 250 GENERAL PHARMACY W/O HCPCS

## 2024-11-19 PROCEDURE — 7100000001 HC RECOVERY ROOM TIME - INITIAL BASE CHARGE: Performed by: OTOLARYNGOLOGY

## 2024-11-19 PROCEDURE — 64742 INCISION OF FACIAL NERVE: CPT | Performed by: OTOLARYNGOLOGY

## 2024-11-19 PROCEDURE — A42425 PR EXC PAROTD,TOTAL,SACRIFICE 5TH NERV: Performed by: ANESTHESIOLOGY

## 2024-11-19 PROCEDURE — 7100000009 HC PHASE TWO TIME - INITIAL BASE CHARGE: Performed by: OTOLARYNGOLOGY

## 2024-11-19 PROCEDURE — 2500000001 HC RX 250 WO HCPCS SELF ADMINISTERED DRUGS (ALT 637 FOR MEDICARE OP): Performed by: STUDENT IN AN ORGANIZED HEALTH CARE EDUCATION/TRAINING PROGRAM

## 2024-11-19 PROCEDURE — 64886 NERVE GRAFT HEAD/NECK >4 CM: CPT | Performed by: OTOLARYNGOLOGY

## 2024-11-19 PROCEDURE — 2500000004 HC RX 250 GENERAL PHARMACY W/ HCPCS (ALT 636 FOR OP/ED)

## 2024-11-19 PROCEDURE — 42415 EXCISE PAROTID GLAND/LESION: CPT | Performed by: OTOLARYNGOLOGY

## 2024-11-19 PROCEDURE — 2500000001 HC RX 250 WO HCPCS SELF ADMINISTERED DRUGS (ALT 637 FOR MEDICARE OP)

## 2024-11-19 PROCEDURE — 3600000004 HC OR TIME - INITIAL BASE CHARGE - PROCEDURE LEVEL FOUR: Performed by: OTOLARYNGOLOGY

## 2024-11-19 PROCEDURE — 7100000002 HC RECOVERY ROOM TIME - EACH INCREMENTAL 1 MINUTE: Performed by: OTOLARYNGOLOGY

## 2024-11-19 PROCEDURE — 64865 REPAIR OF FACIAL NERVE: CPT | Performed by: OTOLARYNGOLOGY

## 2024-11-19 PROCEDURE — 64905 NERVE PEDICLE TRANSFER: CPT | Performed by: OTOLARYNGOLOGY

## 2024-11-19 PROCEDURE — 3700000002 HC GENERAL ANESTHESIA TIME - EACH INCREMENTAL 1 MINUTE: Performed by: OTOLARYNGOLOGY

## 2024-11-19 PROCEDURE — A42425 PR EXC PAROTD,TOTAL,SACRIFICE 5TH NERV: Performed by: NURSE ANESTHETIST, CERTIFIED REGISTERED

## 2024-11-19 PROCEDURE — 64771 SEVER CRANIAL NERVE: CPT | Performed by: OTOLARYNGOLOGY

## 2024-11-19 RX ORDER — CHLORHEXIDINE GLUCONATE ORAL RINSE 1.2 MG/ML
15 SOLUTION DENTAL
Qty: 473 ML | Refills: 0 | Status: SHIPPED | OUTPATIENT
Start: 2024-11-19 | End: 2024-11-30

## 2024-11-19 RX ORDER — BACITRACIN ZINC 500 UNIT/G
OINTMENT (GRAM) TOPICAL 2 TIMES DAILY
Qty: 14.2 G | Refills: 1 | Status: SHIPPED | OUTPATIENT
Start: 2024-11-19

## 2024-11-19 RX ORDER — MIDAZOLAM HYDROCHLORIDE 1 MG/ML
INJECTION, SOLUTION INTRAMUSCULAR; INTRAVENOUS AS NEEDED
Status: DISCONTINUED | OUTPATIENT
Start: 2024-11-19 | End: 2024-11-19

## 2024-11-19 RX ORDER — HYDROMORPHONE HYDROCHLORIDE 1 MG/ML
INJECTION, SOLUTION INTRAMUSCULAR; INTRAVENOUS; SUBCUTANEOUS AS NEEDED
Status: DISCONTINUED | OUTPATIENT
Start: 2024-11-19 | End: 2024-11-19

## 2024-11-19 RX ORDER — MUPIROCIN 20 MG/G
OINTMENT TOPICAL AS NEEDED
Status: DISCONTINUED | OUTPATIENT
Start: 2024-11-19 | End: 2024-11-19 | Stop reason: HOSPADM

## 2024-11-19 RX ORDER — OXYCODONE HYDROCHLORIDE 5 MG/1
5 TABLET ORAL EVERY 6 HOURS PRN
Qty: 12 TABLET | Refills: 0 | Status: SHIPPED | OUTPATIENT
Start: 2024-11-19 | End: 2024-11-22

## 2024-11-19 RX ORDER — ACETAMINOPHEN 500 MG
1000 TABLET ORAL EVERY 8 HOURS PRN
Qty: 90 TABLET | Refills: 0 | Status: SHIPPED | OUTPATIENT
Start: 2024-11-19 | End: 2024-12-04

## 2024-11-19 RX ORDER — ACETAMINOPHEN 325 MG/1
TABLET ORAL AS NEEDED
Status: DISCONTINUED | OUTPATIENT
Start: 2024-11-19 | End: 2024-11-19

## 2024-11-19 RX ORDER — LIDOCAINE HYDROCHLORIDE AND EPINEPHRINE 10; 10 UG/ML; MG/ML
INJECTION, SOLUTION INFILTRATION; PERINEURAL AS NEEDED
Status: DISCONTINUED | OUTPATIENT
Start: 2024-11-19 | End: 2024-11-19 | Stop reason: HOSPADM

## 2024-11-19 RX ORDER — BACITRACIN ZINC 500 UNIT/G
OINTMENT IN PACKET (EA) TOPICAL AS NEEDED
Status: DISCONTINUED | OUTPATIENT
Start: 2024-11-19 | End: 2024-11-19 | Stop reason: HOSPADM

## 2024-11-19 RX ORDER — METOCLOPRAMIDE HYDROCHLORIDE 5 MG/ML
10 INJECTION INTRAMUSCULAR; INTRAVENOUS ONCE AS NEEDED
Status: DISCONTINUED | OUTPATIENT
Start: 2024-11-19 | End: 2024-11-19 | Stop reason: HOSPADM

## 2024-11-19 RX ORDER — SODIUM CHLORIDE, SODIUM LACTATE, POTASSIUM CHLORIDE, CALCIUM CHLORIDE 600; 310; 30; 20 MG/100ML; MG/100ML; MG/100ML; MG/100ML
INJECTION, SOLUTION INTRAVENOUS CONTINUOUS PRN
Status: DISCONTINUED | OUTPATIENT
Start: 2024-11-19 | End: 2024-11-19

## 2024-11-19 RX ORDER — SODIUM CHLORIDE, SODIUM LACTATE, POTASSIUM CHLORIDE, CALCIUM CHLORIDE 600; 310; 30; 20 MG/100ML; MG/100ML; MG/100ML; MG/100ML
50 INJECTION, SOLUTION INTRAVENOUS CONTINUOUS
Status: DISCONTINUED | OUTPATIENT
Start: 2024-11-19 | End: 2024-11-19 | Stop reason: HOSPADM

## 2024-11-19 RX ORDER — OXYMETAZOLINE HCL 0.05 %
SPRAY, NON-AEROSOL (ML) NASAL AS NEEDED
Status: DISCONTINUED | OUTPATIENT
Start: 2024-11-19 | End: 2024-11-19 | Stop reason: HOSPADM

## 2024-11-19 RX ORDER — FENTANYL CITRATE 50 UG/ML
INJECTION, SOLUTION INTRAMUSCULAR; INTRAVENOUS AS NEEDED
Status: DISCONTINUED | OUTPATIENT
Start: 2024-11-19 | End: 2024-11-19

## 2024-11-19 RX ORDER — SUCCINYLCHOLINE CHLORIDE 100 MG/5ML
SYRINGE (ML) INTRAVENOUS AS NEEDED
Status: DISCONTINUED | OUTPATIENT
Start: 2024-11-19 | End: 2024-11-19

## 2024-11-19 RX ORDER — HYDROMORPHONE HYDROCHLORIDE 1 MG/ML
0.4 INJECTION, SOLUTION INTRAMUSCULAR; INTRAVENOUS; SUBCUTANEOUS EVERY 5 MIN PRN
Status: DISCONTINUED | OUTPATIENT
Start: 2024-11-19 | End: 2024-11-19 | Stop reason: HOSPADM

## 2024-11-19 RX ORDER — ONDANSETRON HYDROCHLORIDE 2 MG/ML
4 INJECTION, SOLUTION INTRAVENOUS ONCE AS NEEDED
Status: DISCONTINUED | OUTPATIENT
Start: 2024-11-19 | End: 2024-11-19 | Stop reason: HOSPADM

## 2024-11-19 RX ORDER — OXYCODONE HYDROCHLORIDE 5 MG/1
5 TABLET ORAL EVERY 4 HOURS PRN
Status: DISCONTINUED | OUTPATIENT
Start: 2024-11-19 | End: 2024-11-19 | Stop reason: HOSPADM

## 2024-11-19 RX ORDER — CEFAZOLIN 1 G/1
INJECTION, POWDER, FOR SOLUTION INTRAVENOUS AS NEEDED
Status: DISCONTINUED | OUTPATIENT
Start: 2024-11-19 | End: 2024-11-19

## 2024-11-19 RX ORDER — ONDANSETRON 4 MG/1
4 TABLET, ORALLY DISINTEGRATING ORAL EVERY 6 HOURS PRN
Qty: 20 TABLET | Refills: 0 | Status: SHIPPED | OUTPATIENT
Start: 2024-11-19 | End: 2024-11-24

## 2024-11-19 RX ORDER — METOPROLOL TARTRATE 1 MG/ML
INJECTION, SOLUTION INTRAVENOUS AS NEEDED
Status: DISCONTINUED | OUTPATIENT
Start: 2024-11-19 | End: 2024-11-19

## 2024-11-19 RX ORDER — GABAPENTIN 300 MG/1
CAPSULE ORAL AS NEEDED
Status: DISCONTINUED | OUTPATIENT
Start: 2024-11-19 | End: 2024-11-19

## 2024-11-19 RX ORDER — PROPOFOL 10 MG/ML
INJECTION, EMULSION INTRAVENOUS AS NEEDED
Status: DISCONTINUED | OUTPATIENT
Start: 2024-11-19 | End: 2024-11-19

## 2024-11-19 RX ORDER — METHOCARBAMOL 100 MG/ML
INJECTION, SOLUTION INTRAMUSCULAR; INTRAVENOUS AS NEEDED
Status: DISCONTINUED | OUTPATIENT
Start: 2024-11-19 | End: 2024-11-19

## 2024-11-19 RX ORDER — LIDOCAINE HYDROCHLORIDE 20 MG/ML
INJECTION, SOLUTION INFILTRATION; PERINEURAL AS NEEDED
Status: DISCONTINUED | OUTPATIENT
Start: 2024-11-19 | End: 2024-11-19

## 2024-11-19 RX ORDER — SODIUM CHLORIDE 0.9 G/100ML
IRRIGANT IRRIGATION AS NEEDED
Status: DISCONTINUED | OUTPATIENT
Start: 2024-11-19 | End: 2024-11-19 | Stop reason: HOSPADM

## 2024-11-19 RX ORDER — ALBUTEROL SULFATE 0.83 MG/ML
2.5 SOLUTION RESPIRATORY (INHALATION) ONCE AS NEEDED
Status: DISCONTINUED | OUTPATIENT
Start: 2024-11-19 | End: 2024-11-19 | Stop reason: HOSPADM

## 2024-11-19 RX ORDER — ONDANSETRON HYDROCHLORIDE 2 MG/ML
INJECTION, SOLUTION INTRAVENOUS AS NEEDED
Status: DISCONTINUED | OUTPATIENT
Start: 2024-11-19 | End: 2024-11-19

## 2024-11-19 RX ORDER — TRANEXAMIC ACID 100 MG/ML
INJECTION, SOLUTION INTRAVENOUS AS NEEDED
Status: DISCONTINUED | OUTPATIENT
Start: 2024-11-19 | End: 2024-11-19 | Stop reason: HOSPADM

## 2024-11-19 RX ORDER — CEPHALEXIN 500 MG/1
500 CAPSULE ORAL 4 TIMES DAILY
Qty: 40 CAPSULE | Refills: 0 | Status: SHIPPED | OUTPATIENT
Start: 2024-11-19 | End: 2024-11-29

## 2024-11-19 RX ORDER — LABETALOL HYDROCHLORIDE 5 MG/ML
5 INJECTION, SOLUTION INTRAVENOUS ONCE AS NEEDED
Status: DISCONTINUED | OUTPATIENT
Start: 2024-11-19 | End: 2024-11-19 | Stop reason: HOSPADM

## 2024-11-19 RX ORDER — METOCLOPRAMIDE HYDROCHLORIDE 5 MG/ML
INJECTION INTRAMUSCULAR; INTRAVENOUS AS NEEDED
Status: DISCONTINUED | OUTPATIENT
Start: 2024-11-19 | End: 2024-11-19

## 2024-11-19 RX ORDER — HYDROMORPHONE HYDROCHLORIDE 1 MG/ML
0.2 INJECTION, SOLUTION INTRAMUSCULAR; INTRAVENOUS; SUBCUTANEOUS EVERY 5 MIN PRN
Status: DISCONTINUED | OUTPATIENT
Start: 2024-11-19 | End: 2024-11-19 | Stop reason: HOSPADM

## 2024-11-19 RX ORDER — LIDOCAINE HYDROCHLORIDE 10 MG/ML
0.1 INJECTION, SOLUTION EPIDURAL; INFILTRATION; INTRACAUDAL; PERINEURAL ONCE
Status: DISCONTINUED | OUTPATIENT
Start: 2024-11-19 | End: 2024-11-19 | Stop reason: HOSPADM

## 2024-11-19 RX ORDER — EPINEPHRINE 1 MG/ML
INJECTION, SOLUTION, CONCENTRATE INTRAVENOUS AS NEEDED
Status: DISCONTINUED | OUTPATIENT
Start: 2024-11-19 | End: 2024-11-19 | Stop reason: HOSPADM

## 2024-11-19 RX ORDER — CELECOXIB 200 MG/1
CAPSULE ORAL AS NEEDED
Status: DISCONTINUED | OUTPATIENT
Start: 2024-11-19 | End: 2024-11-19

## 2024-11-19 SDOH — HEALTH STABILITY: MENTAL HEALTH: CURRENT SMOKER: 0

## 2024-11-19 ASSESSMENT — PAIN SCALES - GENERAL
PAINLEVEL_OUTOF10: 5 - MODERATE PAIN
PAINLEVEL_OUTOF10: 5 - MODERATE PAIN
PAINLEVEL_OUTOF10: 0 - NO PAIN
PAINLEVEL_OUTOF10: 2
PAINLEVEL_OUTOF10: 2
PAINLEVEL_OUTOF10: 0 - NO PAIN
PAINLEVEL_OUTOF10: 5 - MODERATE PAIN
PAINLEVEL_OUTOF10: 2
PAINLEVEL_OUTOF10: 0 - NO PAIN
PAINLEVEL_OUTOF10: 2
PAINLEVEL_OUTOF10: 5 - MODERATE PAIN

## 2024-11-19 ASSESSMENT — PAIN - FUNCTIONAL ASSESSMENT
PAIN_FUNCTIONAL_ASSESSMENT: 0-10

## 2024-11-19 ASSESSMENT — COLUMBIA-SUICIDE SEVERITY RATING SCALE - C-SSRS
1. IN THE PAST MONTH, HAVE YOU WISHED YOU WERE DEAD OR WISHED YOU COULD GO TO SLEEP AND NOT WAKE UP?: NO
6. HAVE YOU EVER DONE ANYTHING, STARTED TO DO ANYTHING, OR PREPARED TO DO ANYTHING TO END YOUR LIFE?: NO
2. HAVE YOU ACTUALLY HAD ANY THOUGHTS OF KILLING YOURSELF?: NO

## 2024-11-19 NOTE — ANESTHESIA PROCEDURE NOTES
Airway  Date/Time: 11/19/2024 9:53 AM  Urgency: elective    Airway not difficult    Staffing  Performed: RAMILA   Authorized by: Francie Kaba    Performed by: Francie Kaba  Patient location during procedure: OR    Indications and Patient Condition  Indications for airway management: anesthesia  Spontaneous Ventilation: absent  Sedation level: deep  Preoxygenated: yes  Patient position: sniffing  Mask difficulty assessment: 1 - vent by mask  Planned trial extubation    Final Airway Details  Final airway type: endotracheal airway      Successful airway: AMINTA tube  Cuffed: yes   Successful intubation technique: direct laryngoscopy  Endotracheal tube insertion site: oral  Blade: Chelle  Blade size: #4  Cormack-Lehane Classification: grade I - full view of glottis  Placement verified by: capnometry   Measured from: lips  ETT to lips (cm): 21  Number of attempts at approach: 1  Ventilation between attempts: none  Number of other approaches attempted: 0

## 2024-11-19 NOTE — H&P
History Of Present Illness  Bailey Zamora is a 48 y.o. female presenting with hx of left facial paralysis.     Past Medical History  She has a past medical history of Anxiety, Benign brain tumor (Multi), and OCD (obsessive compulsive disorder).    Surgical History  She has a past surgical history that includes Craniotomy.     Social History  She reports that she has never smoked. She has never used smokeless tobacco. She reports current alcohol use. She reports that she does not use drugs.    Family History  No family history on file.     Allergies  Patient has no known allergies.    Review of Systems  A 12-point review of systems was performed and noted be negative except for that which was mentioned in the history of present illness        Physical Exam   General: Well-developed and well-nourished in appearance.  Skin: No rashes or concerning lesions on the visible portions of the skin.  Eyes: Extraocular movements intact. Visual fields grossly normal.  Ears: Pinna are normal in shape and position. External canals are patent.  Nose: Dorsum is midline. Septum is midline and turbinates are normal on anterior  rhinoscopy.  Oral Cavity/Oropharynx: Dentition is intact. Mucous membranes moist. No masses or  lesions. Tonsils are symmetric and non-obstructive.  Neck: Midline trachea without masses or lesions. Thyroid is normal in size.  Lymphatics: No palpable cervical lymphadenopathy  Respiratory: No respiratory distress. Quiet breathing without stertor or stridor.  Cardiovascular: Warm extremities with equal pulses.  Psych: Normal mood and affect. Judgement and insight appropriate.  Neuro: Alert and oriented. CN II-XII grossly intact. No focal deficits.  Musculoskeletal: Gait intact. Moves all extremities well without apparent deformities.     A comprehensive facial examination was performed with the following highlights:  HB: 6  Appearance at rest: flaccid paralysis     Upper third exam:            Brow: ptosis  present            Upper eyelid: left eyelid weight present, complete closure            Lower eyelid: hyperkinetic, synkinesis            Corneal exam: healthy            [place] phenomenon:      Middle third exam:            NL fold: accentuated on the left            Oral commissure: asymmetry            Lower lip: symmetric    Last Recorded Vitals  There were no vitals taken for this visit.    Relevant Results        Scheduled medications    Continuous medications    PRN medications         Assessment/Plan   Assessment & Plan  Disorder of seventh cranial nerve    Facial paralysis      48 year old with hx of left facial nerve paralysis.    Will proceed to OR today for Procedure(s):  Left facial nerve exploration, parotidectomy, CFNG (sural nerve) transfer             Moshe Fioer MD

## 2024-11-19 NOTE — ANESTHESIA PREPROCEDURE EVALUATION
Patient: Bailey Zamora    Procedure Information       Anesthesia Start Date/Time: 11/19/24 0946    Procedure: Left facial nerve exploration, parotidectomy, CFNG (sural nerve) transfer (Bilateral)    [facility]    Surgeons: Wilfred Muñoz MD            Relevant Problems   Anesthesia (within normal limits)      Cardiac (within normal limits)      Pulmonary (within normal limits)      Neuro   (+) Disorder of seventh cranial nerve   (+) Facial paralysis      GI (within normal limits)      /Renal (within normal limits)      Liver (within normal limits)      Endocrine (within normal limits)      Hematology (within normal limits)       Clinical information reviewed:   Tobacco  Allergies  Meds   Med Hx  Surg Hx  OB Status  Fam Hx  Soc   Hx        NPO Detail:  No data recorded     Physical Exam    Airway  Mallampati: II  TM distance: >3 FB  Neck ROM: full     Cardiovascular   Rhythm: regular     Dental    Pulmonary - normal exam     Abdominal            Anesthesia Plan    History of general anesthesia?: yes  History of complications of general anesthesia?: no    ASA 2     general     The patient is not a current smoker.    intravenous induction   Anesthetic plan and risks discussed with patient.  Use of blood products discussed with patient who consented to blood products.    Plan discussed with resident.

## 2024-11-19 NOTE — LETTER
November 19, 2024     Patient: Bailey Zamora   YOB: 1975   Date of Visit: 11/19/2024       To Whom It May Concern:    Bailey Zamora was seen in my clinic on  11/19/2024. Please excuse Bailey for her absence from work on this day through 12/3/2024. Bailey may return to work 12/4/2024.    If you have any questions or concerns, please don't hesitate to call.         Sincerely,         Wilfred Muñoz MD/BRENNON Javier RN

## 2024-11-19 NOTE — DISCHARGE INSTRUCTIONS
FACIAL PLASTIC SURGERY POSTOPERATIVE INSTRUCTIONS    FACIAL NERVE SURGERY - CROSS FACE NERVE GRAFT, SURAL NERVE TRANSFER, MASSETER NERVE TRANSFER    Important Phone Numbers  Dr. Wilfred Muñoz: [phone]  Ilda Benz L.P.N.: [phone]  Evenings/Weekends Emergency: [phone]  - please ask for the ENT resident on-call      At Home after Surgery:    Head Elevation: Keep your head elevated (the height of 2 pillows is appropriate) for 1 week to help with swelling.   Shower: You may start showering in 48 hours after surgery.  Daily showers are recommended.  Do not let the shower spray hit your incisions directly and do not soak your face or thigh in water.  Allow soapy water to run all over the scalp, face, and leg incisions.  Towel blot your incisions gently after your shower.  Diet: We recommend that you start with a no chewing (essentially liquid diet) for one week, followed by a soft food diet for the following week and progress to your usual diet as tolerated.  Dressing: The head wrap dressing and gauze underneath can be removed 24 hours after surgery. You have a dressing on your leg. The leg stockinet can be removed 24 hours after surgery. The leg dressings can be removed after 48 hours.  Incision Care: You have an incision in front of your ear and along your sideburn. This is similar to the incision used in facelift procedures. Clean the skin incision with soapy water twice daily. Apply aquaphor ointment four times a day. The skin incision will heal most optimally if they are kept moist and clean. You can use hydrogen peroxide on Q-tips to gently clean any crusts, if necessary.  Do not rub but gently dab the incisions to clean.  The skin sutures will be removed 1 week after the operation if they are non-absorbable.  After skin incisions are healed, scars can be optimized with application of sunscreen and silicone gel/sheets. You may apply ointment to the leg incisions in a similar fashion to keep  it moist. The sutures in your leg are non-absorbable and will be removed at your follow up appointment in approximately 2 weeks.  Medications: Take the medications as prescribed.  You can take Tylenol in addition to the narcotic pain medication prescribed.  Resume all home medications the night of surgery unless otherwise directed.   May have Tylenol after: 3:40pm  May have Ibuprofen/advil/motrin/aleve after: 11/20/24 at 9:45am  Scopalamine patch may stay on for 72 hrs.  Remove patch, discard away form pets, children.  Do not touch eyes!  Wash hands thoroughly      Activity: Resume normal activities of daily living, as you feel able.  However, avoid strenuous activity and heavy lifting (more than 10 lbs) for 3 weeks after surgery.  Light activity such as walking may be resumed after 1 week after surgery.  Sport activities may be resumed 1 month after surgery.  Seek Medical Attention: Call the office or seek medical attention if you develop fever greater than 101 degrees, a painful area of fullness and bruising, excessive pain that is not well-controlled, drainage, skin rash, visual disturbances, or other unusual symptoms.     Follow-Up Care:  First Appointment: You will return on 11/27 for suture removal and/or a wound check.  This appointment may be with the nurse, fellow or your physician, and will be scheduled prior to surgery.     Additional Appointments:  Ideally, we would like to see you about 3-6 months after surgery to examine the healing.  This visit will either be with a fellow or your physician.  After this, the follow-up is quite variable, and depends on how you are doing and feeling. Often, this means a visit between 9-12 months after surgery to follow your healing process.  Please call the office at any time if you have any questions or concerns and would like to be seen sooner than your next scheduled visit.

## 2024-11-19 NOTE — ANESTHESIA POSTPROCEDURE EVALUATION
Patient: Bailey Zamora    Procedure Summary       Date: 11/19/24 Room / Location: [facility] OR 03 / [facility]    Anesthesia Start: 0946 Anesthesia Stop: 1456    Procedure: Left facial nerve exploration, right parotidectomy, CFNG (sural nerve) transfer (Bilateral: Face) Diagnosis:       Facial paralysis      Disorder of seventh cranial nerve      (Facial paralysis [G51.0])      (Disorder of seventh cranial nerve [G51.9])    Surgeons: Wilfred Muñoz MD Responsible Provider: Mohinder Richardson MD    Anesthesia Type: general ASA Status: 2            Anesthesia Type: general    Vitals Value Taken Time   /75 11/19/24 1548   Temp 36.6 °C (97.9 °F) 11/19/24 1453   Pulse 94 11/19/24 1548   Resp 16 11/19/24 1548   SpO2 95 % 11/19/24 1548       Anesthesia Post Evaluation    Patient location during evaluation: PACU  Patient participation: complete - patient participated  Level of consciousness: confused  Pain management: satisfactory to patient  Multimodal analgesia pain management approach  Airway patency: patent  Cardiovascular status: acceptable  Respiratory status: airway suctioned  Hydration status: acceptable  Postoperative Nausea and Vomiting: none  Comments: Did very well    There were no known notable events for this encounter.

## 2024-11-20 ASSESSMENT — PAIN SCALES - GENERAL
PAINLEVEL_OUTOF10: 0 - NO PAIN
PAINLEVEL_OUTOF10: 0 - NO PAIN

## 2024-11-21 NOTE — OP NOTE
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]     Procedures  Left facial nerve exploration, right parotidectomy, CFNG (sural nerve) transfer  25462 - RI EXC PRTD AVERY/PRTD GLND LAT DSJ&PRSRV FACIAL NR  21841 - RI TRANSECTION/AVULSION FACIAL NRV DIFFERENT/CMPL  46716 - RI TRANSECTION/AVULSION OTH CRANIAL NRV XDRL  70184 - RI NEUROPLASTY &/TRANSPOSITION CRANIAL NERVE  97299 - RI NERVE PEDICLE TRANSFER FIRST STAGE   37310 - RI NERVE GRAFT HEAD/NECK >4 CM  41603 - RI SUTURE FACIAL NERVE INFRATEMPORAL W/WO GRAFT   52284 - RI MICROSURG TQS REQ USE OPERATING MICROSCOPE  77272 - RI NERVE REPAIR W/AUTOGENOUS VEIN GRAFT EA NERVE  21562 - RI DISSECTION INFRATEMPORAL    Surgeons      * Wilfred Muñoz - Primary    Resident/Fellow/Other Assistant:  Surgeons and Role:  * No surgeons found with a matching role *    Staff:   Circulator: Sima Levi Person: Jennie Woods Scrub: Yessenia Woods Circulator: Adarsh    Anesthesia Staff: Anesthesiologist: Mohinder Richardson MD  CRNA: JHONATHAN Bojorquez-CRNA  SRNA: Francie Kaba    Procedure Summary  Anesthesia: General  ASA: II  Estimated Blood Loss: 10 mL  Intra-op Medications:   Administrations occurring from 0930 to 1245 on 24:   Medication Name Total Dose   oxymetazoline (Afrin) 0.05 % nasal spray 30 mL   lidocaine-epinephrine (Xylocaine W/EPI) 1 %-1:100,000 injection 16 mL   tranexamic acid (Cyklokapron) injection 1 g   EPINEPHrine HCl (PF) (Adrenalin) injection 4 mg   sodium chloride 0.9 % irrigation solution 300 mL   acetaminophen (Tylenol) tablet 975 mg   ceFAZolin (Ancef) 1 g 2 g    celecoxib (CeleBREX) capsule 200 mg 200 mg   dexAMETHasone (Decadron) injection 4 mg/mL 8 mg   fentaNYL PF 0.05 mg/mL 100 mcg   gabapentin (Neurontin) 300 mg capsule 300 mg   HYDROmorphone (Dilaudid) 1 mg/mL injection 0.8 mg   ketamine 10 mg/mL IV syringe 50 mg   LR infusion Cannot be calculated   lidocaine (Xylocaine) injection 2 % 100 mg   methocarbamol (Robaxin) 100 mg/mL 1,000 mg   metoclopramide (Reglan) 5 mg/mL 10 mg   metoprolol 5 mg/5 mL 2 mg   midazolam (Versed) 1 mg/1 mL 2 mg   propofol (Diprivan) infusion 10 mg/mL 1,242 mg   succinylcholine 100 mg/5 mL syringe 100 mg              Anesthesia Record               Intraprocedure I/O Totals          Intake    Propofol Drip 0.00 mL    The total shown is the total volume documented since Anesthesia Start was filed.    Total Intake 0 mL       Output    Urine 400 mL    Est. Blood Loss 20 mL    Total Output 420 mL       Net    Net Volume -420 mL          Specimen: No specimens collected              Drains and/or Catheters:   [REMOVED] Closed/Suction Drain 1 Right Other (Comment) 15 Fr. (Removed)       Tourniquet Times:         Implants:     Findings: see op note    Indications: Bailey Zamora is an 48 y.o. 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     The patient was seen in the preoperative area. The risks, benefits, complications, treatment options, non-operative alternatives, expected recovery and outcomes were discussed with the patient. The possibilities of reaction to medication, pulmonary aspiration, injury to surrounding structures, bleeding, recurrent infection, the need for additional procedures, failure to diagnose a condition, and creating a complication requiring transfusion or operation were discussed with the patient. The patient concurred with the proposed plan, giving informed consent.  The site of surgery was properly noted/marked if necessary per policy. The patient has been actively  warmed in preoperative area. Preoperative antibiotics have been ordered and given within 1 hours of incision. Venous thrombosis prophylaxis have been ordered including bilateral sequential compression devices    Procedure Details:    The patient was brought to the operating room and placed supine on the table.   Identity was confirmed.  General anesthesia was induced and patient was intubated with a cuffed endotracheal tube. A time-out was performed.  The operating table was rotated 90  degrees. SCD boots were placed and the arms protected and tucked at the sides.     The facial nerve monitor was used during the case as well as the hand-held stimulator.  The electrodes were placed and secured at the beginning of the case.     A right facelift incision was created with a 15-blade scalpel.  Skin flaps were then elevated. A superficial musculoaponeurotic flap based on the facial artery distribution was then elevated and raised with dissection in the sub-SMAS layer performed. The parotid fascia was entered in order to expose the gland. Parotid gland lateral lobe was removed in order to identify, and then fully exposed the relevant branches of the facial nerve.    The buccal, marginal, and zygomatic branches were identified. The previous cross face nerve graft from prior surgery was identified and preserved. Nerve stimulation of these branches was performed. Nerves contributing to favorable movement of the oral commissure and orbicularis oculi muscles were maintained. This including upward and lateral movement of the commissure. The most favorable zygomatic branch was lysed for coaptation.     Using the subzygomatic triangle boundaries, dissection proceeded to find the masseteric nerve.  This included removal of the overlying parotid tissue. Masseter muscle was encountered and dissection proceeded to separate and resect the fibers, so that the masseteric nerve was identified. This was confirmed with nerve stimulation and  dissected distally for length.  The proximal dissection then proceeded to achieve length for transposition of the nerve. The dissection was carried superiorly to the infratemporal space where further dissection was carried out from the masseteric fibers. It was then divided and transposed into the parotid.     At the left lower extremity, a sural nerve and lower saphenous vein graft were harvested. Stair step incisions approximately 2 cm in length were made along the left lower leg. The sural nerve was identified and tracked along these incisions. Approximately 25 cm of sural nerve was harvested. A 4 cm segment of saphenous vein was harvested. The incisions were closed in a multilayer fashion with vicryl and prolene suture.    The sural nerve was brought to the face. A 15 round drain was utilized with a trocar to perform the first stage nerve transfer. The trocar was used to tunnel from the parotid wound to the upper lip. The trocar was then carried across to the left earlobe. The sural nerve was then passed through the drain via atraumatic suction technique and the distal end was marked just anterior to the earlobe with a 4-0 prolene suture. The left pre-auricular skin and intra-oral conduit incisions were closed with gut suture. We then proceeded to perform nerve coaptation and reconstruction.    The operating microscope was used to perform the nerve coaptation between the masseteric and zygomatic branch and cross face sural graft with 8-0 nylon sutures x 3. A Y-formation of the two ends of the masseteric and zygomatic branch were used to coapt to the end of the sural nerve. This was tension free and axons were well approximated. A vein graft was utilized as a conduit for the nerve coaptation.    The superficial musculoaponeurotic flap was then resuspended to the adjacent SMAS edges with PDS suture in order to achieve suspension of the facial fascial layers appropriately. The skin was closed with 5-0 fast gut  suture. Ointment and xeroform was applied to the incision with a face lift dressing placed.    The patient was awoken from general anesthesia and sent to the recovery room in a stable condition.    Complications:  None; patient tolerated the procedure well.    Disposition: PACU - hemodynamically stable.  Condition: stable                 Additional Details: None    Attending Attestation: I was present and scrubbed for the entire procedure.    Wilfred Muñoz  Phone Number: [phone]

## 2024-11-22 ENCOUNTER — OFFICE VISIT (OUTPATIENT)
Facility: CLINIC | Age: 49
End: 2024-11-22
Payer: COMMERCIAL

## 2024-11-22 VITALS — BODY MASS INDEX: 29.6 KG/M2 | WEIGHT: 188.6 LBS | HEIGHT: 67 IN

## 2024-11-22 DIAGNOSIS — M79.605 PAIN OF LEFT LOWER EXTREMITY: Primary | ICD-10-CM

## 2024-11-22 DIAGNOSIS — G51.0 FACIAL PARALYSIS: ICD-10-CM

## 2024-11-22 PROCEDURE — 3008F BODY MASS INDEX DOCD: CPT | Performed by: PHYSICIAN ASSISTANT

## 2024-11-22 PROCEDURE — 1036F TOBACCO NON-USER: CPT | Performed by: PHYSICIAN ASSISTANT

## 2024-11-22 PROCEDURE — 99024 POSTOP FOLLOW-UP VISIT: CPT | Performed by: PHYSICIAN ASSISTANT

## 2024-11-22 RX ORDER — DICLOFENAC SODIUM 30 MG/G
GEL TOPICAL 2 TIMES DAILY
Qty: 100 G | Refills: 2 | Status: SHIPPED | OUTPATIENT
Start: 2024-11-22

## 2024-11-22 NOTE — PROGRESS NOTES
Facial Plastic & Reconstructive Surgery    Dx: Right facial paralysis  POV s/p 11/19/24 Left facial nerve exploration, right parotidectomy, left CFNG (sural nerve) transfer     Doing well, endorses improved facial pain and swelling  Endorses LEFT foot nerve pain ; taking 100 mg of Gabapentin and oxy which improve the pain  Continues ointment to surgical sites    Incisions c/d/I  Expected postop swelling of left hemiface, a/w ecchymosis  10/10 TTP of bottom of left center plantar surface of foot and posterior ankle  Mild edema of LLE, sutures in place along posterior left lower leg, no signs of dehiscence    Plan:  Continue ointment to surgical sites TID  RTC next Friday for left lower extremity suture removal   Recommend Voltaren gel to areas of sensitivity on left foot/ankle  Compressions all day and elevation as often as possible  Heating pads recommended   Continue Gabapentin, oxy prn, and Ibuprofen PRN    Bruce Hill PA-C

## 2024-11-27 ENCOUNTER — APPOINTMENT (OUTPATIENT)
Facility: CLINIC | Age: 49
End: 2024-11-27
Payer: COMMERCIAL

## 2024-11-29 ENCOUNTER — ANESTHESIA (OUTPATIENT)
Dept: OPERATING ROOM | Facility: CLINIC | Age: 49
End: 2024-11-29
Payer: COMMERCIAL

## 2024-11-29 ENCOUNTER — APPOINTMENT (OUTPATIENT)
Facility: CLINIC | Age: 49
End: 2024-11-29
Payer: COMMERCIAL

## 2024-11-29 DIAGNOSIS — G51.9 ABNORMALITY OF FACIAL NERVE: ICD-10-CM

## 2024-11-29 DIAGNOSIS — G51.0 FACIAL PARALYSIS: Primary | ICD-10-CM

## 2024-11-29 DIAGNOSIS — R25.8 SYNKINESIA: ICD-10-CM

## 2024-11-29 PROCEDURE — 99024 POSTOP FOLLOW-UP VISIT: CPT | Performed by: PHYSICIAN ASSISTANT

## 2024-11-29 RX ORDER — GABAPENTIN 100 MG/1
100 CAPSULE ORAL NIGHTLY
Qty: 30 CAPSULE | Refills: 1 | Status: SHIPPED | OUTPATIENT
Start: 2024-11-29 | End: 2025-01-28

## 2024-12-11 ENCOUNTER — TELEPHONE (OUTPATIENT)
Dept: OTOLARYNGOLOGY | Facility: CLINIC | Age: 49
End: 2024-12-11
Payer: COMMERCIAL

## 2025-01-16 ENCOUNTER — APPOINTMENT (OUTPATIENT)
Facility: CLINIC | Age: 50
End: 2025-01-16
Payer: COMMERCIAL

## 2025-01-16 VITALS — BODY MASS INDEX: 30.61 KG/M2 | HEIGHT: 67 IN | WEIGHT: 195 LBS

## 2025-01-16 DIAGNOSIS — M95.2 ACQUIRED DEFORMITY OF HEAD: ICD-10-CM

## 2025-01-16 DIAGNOSIS — G51.9 DISORDER OF SEVENTH CRANIAL NERVE: ICD-10-CM

## 2025-01-16 DIAGNOSIS — M79.605 PAIN OF LEFT LOWER EXTREMITY: ICD-10-CM

## 2025-01-16 DIAGNOSIS — R29.810 FACIAL WEAKNESS: ICD-10-CM

## 2025-01-16 PROCEDURE — 3008F BODY MASS INDEX DOCD: CPT | Performed by: OTOLARYNGOLOGY

## 2025-01-16 PROCEDURE — 99213 OFFICE O/P EST LOW 20 MIN: CPT | Performed by: OTOLARYNGOLOGY

## 2025-01-16 ASSESSMENT — PAIN SCALES - GENERAL: PAINLEVEL_OUTOF10: 0-NO PAIN

## 2025-01-16 NOTE — PROGRESS NOTES
Facial Plastic & Reconstructive Surgery      POV s/p 11/19/24 Left facial nerve exploration, right parotidectomy, CFNG (sural nerve) transfer     Presents today for surgical planning visit to discuss next stage surgery which is planned for 4/22/25.     A 14 organ review of systems was reviewed with the patient. Pertinent items are noted in HPI. She denies otalgia, odynophagia, dysphagia, dysarthria, voice changes, hemoptysis, or weight loss.     Past, family, and social history obtained but not pertinent to current problem.     Physical Exam     General: Well-developed and well-nourished in appearance.  Skin: No rashes or concerning lesions on the visible portions of the skin.  Eyes: Extraocular movements intact. Visual fields grossly normal.  Ears: Pinna are normal in shape and position. External canals are patent.  Nose: Dorsum is midline. Septum is midline and turbinates are normal on anterior rhinoscopy.  Oral Cavity/Oropharynx: Dentition is intact. Mucous membranes moist. No masses or  lesions. Tonsils are symmetric and non-obstructive.  Neck: Midline trachea without masses or lesions. Thyroid is normal in size.  Lymphatics: No palpable cervical lymphadenopathy  Respiratory: No respiratory distress. Quiet breathing without stertor or stridor.  Cardiovascular: Regular rate and rhythm. Warm extremities with equal pulses.  Psych: Normal mood and affect. Judgement and insight appropriate.  Neuro: Alert and oriented. CN II-XII grossly intact. No focal deficits.  Musculoskeletal: Gait intact. Moves all extremities well without apparent deformities.     Plan:  OR planned for 4/22/25  2nd stage nerve transfer  Gracilis free flap, neck exploration, facial suspension  Masseteric nerve transfer      Wilfred Muñoz MD      , Facial Plastic & Reconstructive Surgery        P: [phone]  F: [phone]      ----------------------------------------  11/22/24    Dx: Right facial  paralysis  POV s/p 11/19/24 Left facial nerve exploration, right parotidectomy, left CFNG (sural nerve) transfer     Doing well, endorses improved facial pain and swelling  Endorses LEFT foot nerve pain ; taking 100 mg of Gabapentin and oxy which improve the pain  Continues ointment to surgical sites    Incisions c/d/I  Expected postop swelling of left hemiface, a/w ecchymosis  10/10 TTP of bottom of left center plantar surface of foot and posterior ankle  Mild edema of LLE, sutures in place along posterior left lower leg, no signs of dehiscence    Plan:  Continue ointment to surgical sites TID  RTC next Friday for left lower extremity suture removal   Recommend Voltaren gel to areas of sensitivity on left foot/ankle  Compressions all day and elevation as often as possible  Heating pads recommended   Continue Gabapentin, oxy prn, and Ibuprofen PRN

## 2025-01-17 PROBLEM — R29.810 FACIAL WEAKNESS: Status: ACTIVE | Noted: 2025-01-16

## 2025-01-17 PROBLEM — M95.2 ACQUIRED DEFORMITY OF HEAD: Status: ACTIVE | Noted: 2025-01-16

## 2025-01-22 ENCOUNTER — TELEPHONE (OUTPATIENT)
Dept: OTOLARYNGOLOGY | Facility: CLINIC | Age: 50
End: 2025-01-22

## 2025-01-22 DIAGNOSIS — M79.605 PAIN OF LEFT LOWER EXTREMITY: ICD-10-CM

## 2025-01-22 DIAGNOSIS — G51.9 ABNORMALITY OF FACIAL NERVE: ICD-10-CM

## 2025-01-22 RX ORDER — DOXYCYCLINE 100 MG/1
100 TABLET ORAL 2 TIMES DAILY
Qty: 28 TABLET | Refills: 0 | Status: SHIPPED | OUTPATIENT
Start: 2025-01-22 | End: 2025-02-05

## 2025-01-22 RX ORDER — DOXYCYCLINE 100 MG/1
100 TABLET ORAL 2 TIMES DAILY
Qty: 28 TABLET | Refills: 0 | Status: CANCELLED | OUTPATIENT
Start: 2025-01-22 | End: 2025-02-05

## 2025-01-23 RX ORDER — DOXYCYCLINE 100 MG/1
100 TABLET ORAL 2 TIMES DAILY
Qty: 28 TABLET | Refills: 0 | Status: SHIPPED | OUTPATIENT
Start: 2025-01-23 | End: 2025-02-06

## 2025-02-20 ENCOUNTER — TELEPHONE (OUTPATIENT)
Facility: CLINIC | Age: 50
End: 2025-02-20
Payer: COMMERCIAL

## 2025-02-20 NOTE — TELEPHONE ENCOUNTER
Patient offered earlier surgery date and patient stated unable to move up due to her job.    Pt called in regards to needing her lab orders ordered. She asked once ordered place to notify her via portal or phone call. Please advise

## 2025-03-18 NOTE — PROGRESS NOTES
Facial Plastic & Reconstructive Surgery      POV s/p 11/19/24 Left facial nerve exploration, right parotidectomy, CFNG (sural nerve) transfer     Presents today for surgical planning visit to discuss next stage surgery which is planned for 4/22/25.     Plan for second stage reconstruction with gracilis free flap, second stage nerve transfer, facial suspension.    A 14 organ review of systems was reviewed with the patient. Pertinent items are noted in HPI. She denies otalgia, odynophagia, dysphagia, dysarthria, voice changes, hemoptysis, or weight loss.     Past, family, and social history obtained but not pertinent to current problem.     Physical Exam     General: Well-developed and well-nourished in appearance.  Skin: No rashes or concerning lesions on the visible portions of the skin.  Eyes: Extraocular movements intact. Visual fields grossly normal.  Ears: Pinna are normal in shape and position. External canals are patent.  Nose: Dorsum is midline. Septum is midline and turbinates are normal on anterior rhinoscopy.  Oral Cavity/Oropharynx: Dentition is intact. Mucous membranes moist. No masses or  lesions. Tonsils are symmetric and non-obstructive.  Neck: Midline trachea without masses or lesions. Thyroid is normal in size.  Lymphatics: No palpable cervical lymphadenopathy  Respiratory: No respiratory distress. Quiet breathing without stertor or stridor.  Cardiovascular: Regular rate and rhythm. Warm extremities with equal pulses.  Psych: Normal mood and affect. Judgement and insight appropriate.  Neuro: Alert and oriented. CN II-XII grossly intact. No focal deficits.  Musculoskeletal: Gait intact. Moves all extremities well without apparent deformities.     Plan:  OR planned for 4/22/25  2nd stage nerve transfer  Gracilis free flap, neck exploration, facial suspension  Masseteric nerve transfer      Wilfred Muñoz MD      , Facial Plastic & Reconstructive  Surgery        P: [phone]  F: [phone]      ----------------------------------------  11/22/24    Dx: Right facial paralysis  POV s/p 11/19/24 Left facial nerve exploration, right parotidectomy, left CFNG (sural nerve) transfer     Doing well, endorses improved facial pain and swelling  Endorses LEFT foot nerve pain ; taking 100 mg of Gabapentin and oxy which improve the pain  Continues ointment to surgical sites    Incisions c/d/I  Expected postop swelling of left hemiface, a/w ecchymosis  10/10 TTP of bottom of left center plantar surface of foot and posterior ankle  Mild edema of LLE, sutures in place along posterior left lower leg, no signs of dehiscence    Plan:  Continue ointment to surgical sites TID  RTC next Friday for left lower extremity suture removal   Recommend Voltaren gel to areas of sensitivity on left foot/ankle  Compressions all day and elevation as often as possible  Heating pads recommended   Continue Gabapentin, oxy prn, and Ibuprofen PRN

## 2025-03-20 ENCOUNTER — APPOINTMENT (OUTPATIENT)
Facility: CLINIC | Age: 50
End: 2025-03-20
Payer: COMMERCIAL

## 2025-03-20 VITALS — HEIGHT: 68 IN | BODY MASS INDEX: 27.13 KG/M2 | WEIGHT: 179 LBS

## 2025-03-20 DIAGNOSIS — G51.9 ABNORMALITY OF FACIAL NERVE: Primary | ICD-10-CM

## 2025-03-20 PROCEDURE — 3008F BODY MASS INDEX DOCD: CPT | Performed by: OTOLARYNGOLOGY

## 2025-03-20 PROCEDURE — 99212 OFFICE O/P EST SF 10 MIN: CPT | Performed by: OTOLARYNGOLOGY

## 2025-03-20 ASSESSMENT — PAIN SCALES - GENERAL: PAINLEVEL_OUTOF10: 0-NO PAIN

## 2025-04-21 ENCOUNTER — ANESTHESIA EVENT (OUTPATIENT)
Dept: OPERATING ROOM | Facility: HOSPITAL | Age: 50
End: 2025-04-21
Payer: COMMERCIAL

## 2025-04-21 NOTE — PROGRESS NOTES
Pharmacy Medication History Review    Bailey Zamora is a 49 y.o. female who is planned to be admitted for No Principal Problem: There is no principal problem currently on the Problem List. Please update the Problem List and refresh.. Pharmacy called the patient prior to their scheduled procedure and reviewed the patient's [id number] medications for accuracy.    Medications ADDED:  none  Medications CHANGED:  none  Medications REMOVED:   Bacitracin oint  Diclofenac gel 3%  Medihoney  Gabapentin 100mg    Please review updated prior to admission medication list and comments regarding how patient may be taking medications differently by going to Admission tab --> Admission Orders --> Admit Orders / Review prior to admission medications.     Preferred pharmacy, last doses of medications, and allergies to be confirmed with patient by nursing the day of procedure.     Sources used to complete the med history include:  [facility]  Pharmacy dispense history  Patient Interview Good historian  Chart Review  Care Everywhere     Below are additional concerns with the patient's PTA list.  none    Kelly Santillan [facility]  Please reach out via Secure Chat for questions

## 2025-04-22 ENCOUNTER — HOSPITAL ENCOUNTER (INPATIENT)
Facility: HOSPITAL | Age: 50
LOS: 2 days | Discharge: HOME | DRG: 042 | End: 2025-04-24
Attending: OTOLARYNGOLOGY | Admitting: OTOLARYNGOLOGY
Payer: COMMERCIAL

## 2025-04-22 ENCOUNTER — ANESTHESIA (OUTPATIENT)
Dept: OPERATING ROOM | Facility: HOSPITAL | Age: 50
End: 2025-04-22
Payer: COMMERCIAL

## 2025-04-22 DIAGNOSIS — G51.0 FACIAL PARALYSIS: Primary | ICD-10-CM

## 2025-04-22 DIAGNOSIS — M95.2 ACQUIRED DEFORMITY OF HEAD: ICD-10-CM

## 2025-04-22 DIAGNOSIS — R29.810 FACIAL WEAKNESS: ICD-10-CM

## 2025-04-22 DIAGNOSIS — G51.9 DISORDER OF SEVENTH CRANIAL NERVE: ICD-10-CM

## 2025-04-22 DIAGNOSIS — G89.18 POSTOPERATIVE PAIN: ICD-10-CM

## 2025-04-22 LAB
ABO GROUP (TYPE) IN BLOOD: NORMAL
ALBUMIN SERPL BCP-MCNC: 4.4 G/DL (ref 3.4–5)
ANION GAP BLDA CALCULATED.4IONS-SCNC: 12 MMO/L (ref 10–25)
ANION GAP BLDA CALCULATED.4IONS-SCNC: 12 MMO/L (ref 10–25)
ANION GAP SERPL CALC-SCNC: 13 MMOL/L (ref 10–20)
ANTIBODY SCREEN: NORMAL
BASE EXCESS BLDA CALC-SCNC: -5.8 MMOL/L (ref -2–3)
BASE EXCESS BLDA CALC-SCNC: -6.2 MMOL/L (ref -2–3)
BODY TEMPERATURE: 37 DEGREES CELSIUS
BODY TEMPERATURE: 37 DEGREES CELSIUS
BUN SERPL-MCNC: 10 MG/DL (ref 6–23)
CA-I BLDA-SCNC: 1.02 MMOL/L (ref 1.1–1.33)
CA-I BLDA-SCNC: 1.12 MMOL/L (ref 1.1–1.33)
CALCIUM SERPL-MCNC: 8.8 MG/DL (ref 8.6–10.6)
CHLORIDE BLDA-SCNC: 111 MMOL/L (ref 98–107)
CHLORIDE BLDA-SCNC: 112 MMOL/L (ref 98–107)
CHLORIDE SERPL-SCNC: 108 MMOL/L (ref 98–107)
CO2 SERPL-SCNC: 23 MMOL/L (ref 21–32)
CREAT SERPL-MCNC: 0.91 MG/DL (ref 0.5–1.05)
EGFRCR SERPLBLD CKD-EPI 2021: 77 ML/MIN/1.73M*2
GLUCOSE BLDA-MCNC: 108 MG/DL (ref 74–99)
GLUCOSE BLDA-MCNC: 127 MG/DL (ref 74–99)
GLUCOSE SERPL-MCNC: 116 MG/DL (ref 74–99)
HCO3 BLDA-SCNC: 18.9 MMOL/L (ref 22–26)
HCO3 BLDA-SCNC: 20.1 MMOL/L (ref 22–26)
HCT VFR BLD EST: 26 % (ref 36–46)
HCT VFR BLD EST: 32 % (ref 36–46)
HGB BLDA-MCNC: 10.6 G/DL (ref 12–16)
HGB BLDA-MCNC: 8.7 G/DL (ref 12–16)
INHALED O2 CONCENTRATION: 30 %
INHALED O2 CONCENTRATION: 30 %
LACTATE BLDA-SCNC: 1.3 MMOL/L (ref 0.4–2)
LACTATE BLDA-SCNC: 1.3 MMOL/L (ref 0.4–2)
MAGNESIUM SERPL-MCNC: 2.33 MG/DL (ref 1.6–2.4)
OXYHGB MFR BLDA: 96.9 % (ref 94–98)
OXYHGB MFR BLDA: 97 % (ref 94–98)
PCO2 BLDA: 35 MM HG (ref 38–42)
PCO2 BLDA: 40 MM HG (ref 38–42)
PH BLDA: 7.31 PH (ref 7.38–7.42)
PH BLDA: 7.34 PH (ref 7.38–7.42)
PHOSPHATE SERPL-MCNC: 3.7 MG/DL (ref 2.5–4.9)
PO2 BLDA: 120 MM HG (ref 85–95)
PO2 BLDA: 125 MM HG (ref 85–95)
POTASSIUM BLDA-SCNC: 3.2 MMOL/L (ref 3.5–5.3)
POTASSIUM BLDA-SCNC: 3.7 MMOL/L (ref 3.5–5.3)
POTASSIUM SERPL-SCNC: 3.5 MMOL/L (ref 3.5–5.3)
PREGNANCY TEST URINE, POC: NEGATIVE
RH FACTOR (ANTIGEN D): NORMAL
SAO2 % BLDA: 99 % (ref 94–100)
SAO2 % BLDA: 99 % (ref 94–100)
SODIUM BLDA-SCNC: 139 MMOL/L (ref 136–145)
SODIUM BLDA-SCNC: 140 MMOL/L (ref 136–145)
SODIUM SERPL-SCNC: 140 MMOL/L (ref 136–145)

## 2025-04-22 PROCEDURE — 64911 NEURORRAPHY W/VEIN AUTOGRAFT: CPT | Performed by: STUDENT IN AN ORGANIZED HEALTH CARE EDUCATION/TRAINING PROGRAM

## 2025-04-22 PROCEDURE — 4A11X4G MONITORING OF PERIPHERAL NERVOUS ELECTRICAL ACTIVITY, INTRAOPERATIVE, EXTERNAL APPROACH: ICD-10-PCS | Performed by: OTOLARYNGOLOGY

## 2025-04-22 PROCEDURE — 42415 EXCISE PAROTID GLAND/LESION: CPT | Performed by: STUDENT IN AN ORGANIZED HEALTH CARE EDUCATION/TRAINING PROGRAM

## 2025-04-22 PROCEDURE — 96372 THER/PROPH/DIAG INJ SC/IM: CPT

## 2025-04-22 PROCEDURE — 21295 REVISION OF JAW MUSCLE/BONE: CPT | Performed by: OTOLARYNGOLOGY

## 2025-04-22 PROCEDURE — 2500000004 HC RX 250 GENERAL PHARMACY W/ HCPCS (ALT 636 FOR OP/ED): Mod: JZ

## 2025-04-22 PROCEDURE — 7100000001 HC RECOVERY ROOM TIME - INITIAL BASE CHARGE: Performed by: OTOLARYNGOLOGY

## 2025-04-22 PROCEDURE — 3600000003 HC OR TIME - INITIAL BASE CHARGE - PROCEDURE LEVEL THREE: Performed by: OTOLARYNGOLOGY

## 2025-04-22 PROCEDURE — 3700000001 HC GENERAL ANESTHESIA TIME - INITIAL BASE CHARGE: Performed by: OTOLARYNGOLOGY

## 2025-04-22 PROCEDURE — 64716 REVISION OF CRANIAL NERVE: CPT | Performed by: OTOLARYNGOLOGY

## 2025-04-22 PROCEDURE — 15839 EXC EXCESSIVE SKN OTHER AREA: CPT | Performed by: OTOLARYNGOLOGY

## 2025-04-22 PROCEDURE — 64742 INCISION OF FACIAL NERVE: CPT | Performed by: OTOLARYNGOLOGY

## 2025-04-22 PROCEDURE — 35800 EXPLORE NECK VESSELS: CPT | Performed by: OTOLARYNGOLOGY

## 2025-04-22 PROCEDURE — 35701 EXPL N/FLWD SURG NECK ART: CPT | Performed by: STUDENT IN AN ORGANIZED HEALTH CARE EDUCATION/TRAINING PROGRAM

## 2025-04-22 PROCEDURE — 15842 NERVE PALSY MICROSURG GRAFT: CPT | Performed by: OTOLARYNGOLOGY

## 2025-04-22 PROCEDURE — 2500000005 HC RX 250 GENERAL PHARMACY W/O HCPCS: Performed by: STUDENT IN AN ORGANIZED HEALTH CARE EDUCATION/TRAINING PROGRAM

## 2025-04-22 PROCEDURE — P9045 ALBUMIN (HUMAN), 5%, 250 ML: HCPCS | Mod: JZ

## 2025-04-22 PROCEDURE — 64771 SEVER CRANIAL NERVE: CPT | Performed by: OTOLARYNGOLOGY

## 2025-04-22 PROCEDURE — 15004 WOUND PREP F/N/HF/G: CPT | Performed by: OTOLARYNGOLOGY

## 2025-04-22 PROCEDURE — 64907 NERVE PEDICLE TRANSFER: CPT | Performed by: STUDENT IN AN ORGANIZED HEALTH CARE EDUCATION/TRAINING PROGRAM

## 2025-04-22 PROCEDURE — 64784 REMOVE NERVE LESION: CPT | Performed by: OTOLARYNGOLOGY

## 2025-04-22 PROCEDURE — 0KBQ0ZZ EXCISION OF RIGHT UPPER LEG MUSCLE, OPEN APPROACH: ICD-10-PCS | Performed by: OTOLARYNGOLOGY

## 2025-04-22 PROCEDURE — 2500000002 HC RX 250 W HCPCS SELF ADMINISTERED DRUGS (ALT 637 FOR MEDICARE OP, ALT 636 FOR OP/ED)

## 2025-04-22 PROCEDURE — 36415 COLL VENOUS BLD VENIPUNCTURE: CPT

## 2025-04-22 PROCEDURE — 15842 NERVE PALSY MICROSURG GRAFT: CPT | Performed by: STUDENT IN AN ORGANIZED HEALTH CARE EDUCATION/TRAINING PROGRAM

## 2025-04-22 PROCEDURE — 15730 MDFC FLAP W/PRSRV VASC PEDCL: CPT | Performed by: STUDENT IN AN ORGANIZED HEALTH CARE EDUCATION/TRAINING PROGRAM

## 2025-04-22 PROCEDURE — 15730 MDFC FLAP W/PRSRV VASC PEDCL: CPT | Performed by: OTOLARYNGOLOGY

## 2025-04-22 PROCEDURE — 0JB50ZZ EXCISION OF LEFT NECK SUBCUTANEOUS TISSUE AND FASCIA, OPEN APPROACH: ICD-10-PCS | Performed by: OTOLARYNGOLOGY

## 2025-04-22 PROCEDURE — 64866 FUSION OF FACIAL/OTHER NERVE: CPT | Performed by: OTOLARYNGOLOGY

## 2025-04-22 PROCEDURE — 2500000004 HC RX 250 GENERAL PHARMACY W/ HCPCS (ALT 636 FOR OP/ED): Mod: JZ | Performed by: ANESTHESIOLOGY

## 2025-04-22 PROCEDURE — 61605 RESECT/EXCISE CRANIAL LESION: CPT | Performed by: STUDENT IN AN ORGANIZED HEALTH CARE EDUCATION/TRAINING PROGRAM

## 2025-04-22 PROCEDURE — 82435 ASSAY OF BLOOD CHLORIDE: CPT

## 2025-04-22 PROCEDURE — 00BM0ZZ EXCISION OF FACIAL NERVE, OPEN APPROACH: ICD-10-PCS | Performed by: OTOLARYNGOLOGY

## 2025-04-22 PROCEDURE — 2500000005 HC RX 250 GENERAL PHARMACY W/O HCPCS

## 2025-04-22 PROCEDURE — 14301 TIS TRNFR ANY 30.1-60 SQ CM: CPT | Performed by: OTOLARYNGOLOGY

## 2025-04-22 PROCEDURE — 2500000005 HC RX 250 GENERAL PHARMACY W/O HCPCS: Performed by: OTOLARYNGOLOGY

## 2025-04-22 PROCEDURE — 86901 BLOOD TYPING SEROLOGIC RH(D): CPT | Performed by: OTOLARYNGOLOGY

## 2025-04-22 PROCEDURE — 81025 URINE PREGNANCY TEST: CPT | Performed by: OTOLARYNGOLOGY

## 2025-04-22 PROCEDURE — 80069 RENAL FUNCTION PANEL: CPT

## 2025-04-22 PROCEDURE — 2500000002 HC RX 250 W HCPCS SELF ADMINISTERED DRUGS (ALT 637 FOR MEDICARE OP, ALT 636 FOR OP/ED): Performed by: STUDENT IN AN ORGANIZED HEALTH CARE EDUCATION/TRAINING PROGRAM

## 2025-04-22 PROCEDURE — 64866 FUSION OF FACIAL/OTHER NERVE: CPT | Performed by: STUDENT IN AN ORGANIZED HEALTH CARE EDUCATION/TRAINING PROGRAM

## 2025-04-22 PROCEDURE — 83735 ASSAY OF MAGNESIUM: CPT

## 2025-04-22 PROCEDURE — 64911 NEURORRAPHY W/VEIN AUTOGRAFT: CPT | Performed by: OTOLARYNGOLOGY

## 2025-04-22 PROCEDURE — 2500000004 HC RX 250 GENERAL PHARMACY W/ HCPCS (ALT 636 FOR OP/ED): Performed by: OTOLARYNGOLOGY

## 2025-04-22 PROCEDURE — 2500000001 HC RX 250 WO HCPCS SELF ADMINISTERED DRUGS (ALT 637 FOR MEDICARE OP): Performed by: STUDENT IN AN ORGANIZED HEALTH CARE EDUCATION/TRAINING PROGRAM

## 2025-04-22 PROCEDURE — 1170000001 HC PRIVATE ONCOLOGY ROOM DAILY

## 2025-04-22 PROCEDURE — 2500000004 HC RX 250 GENERAL PHARMACY W/ HCPCS (ALT 636 FOR OP/ED): Performed by: STUDENT IN AN ORGANIZED HEALTH CARE EDUCATION/TRAINING PROGRAM

## 2025-04-22 PROCEDURE — 2720000007 HC OR 272 NO HCPCS: Performed by: OTOLARYNGOLOGY

## 2025-04-22 PROCEDURE — 36415 COLL VENOUS BLD VENIPUNCTURE: CPT | Performed by: OTOLARYNGOLOGY

## 2025-04-22 PROCEDURE — 2500000001 HC RX 250 WO HCPCS SELF ADMINISTERED DRUGS (ALT 637 FOR MEDICARE OP)

## 2025-04-22 PROCEDURE — 3700000002 HC GENERAL ANESTHESIA TIME - EACH INCREMENTAL 1 MINUTE: Performed by: OTOLARYNGOLOGY

## 2025-04-22 PROCEDURE — 0KR107Z REPLACEMENT OF FACIAL MUSCLE WITH AUTOLOGOUS TISSUE SUBSTITUTE, OPEN APPROACH: ICD-10-PCS | Performed by: OTOLARYNGOLOGY

## 2025-04-22 PROCEDURE — 64716 REVISION OF CRANIAL NERVE: CPT | Performed by: STUDENT IN AN ORGANIZED HEALTH CARE EDUCATION/TRAINING PROGRAM

## 2025-04-22 PROCEDURE — 64868 FUSION OF FACIAL/OTHER NERVE: CPT | Performed by: OTOLARYNGOLOGY

## 2025-04-22 PROCEDURE — 15750 NEUROVASCULAR PEDICLE FLAP: CPT | Performed by: OTOLARYNGOLOGY

## 2025-04-22 PROCEDURE — 14302 TIS TRNFR ADDL 30 SQ CM: CPT | Performed by: OTOLARYNGOLOGY

## 2025-04-22 PROCEDURE — 3600000008 HC OR TIME - EACH INCREMENTAL 1 MINUTE - PROCEDURE LEVEL THREE: Performed by: OTOLARYNGOLOGY

## 2025-04-22 PROCEDURE — 88305 TISSUE EXAM BY PATHOLOGIST: CPT | Mod: TC,SUR | Performed by: OTOLARYNGOLOGY

## 2025-04-22 PROCEDURE — 42415 EXCISE PAROTID GLAND/LESION: CPT | Performed by: OTOLARYNGOLOGY

## 2025-04-22 PROCEDURE — 64865 REPAIR OF FACIAL NERVE: CPT | Performed by: OTOLARYNGOLOGY

## 2025-04-22 PROCEDURE — 61605 RESECT/EXCISE CRANIAL LESION: CPT | Performed by: OTOLARYNGOLOGY

## 2025-04-22 PROCEDURE — 15840 NERVE PALSY FASCIAL GRAFT: CPT | Performed by: OTOLARYNGOLOGY

## 2025-04-22 PROCEDURE — 0CB90ZZ EXCISION OF LEFT PAROTID GLAND, OPEN APPROACH: ICD-10-PCS | Performed by: OTOLARYNGOLOGY

## 2025-04-22 PROCEDURE — 7100000002 HC RECOVERY ROOM TIME - EACH INCREMENTAL 1 MINUTE: Performed by: OTOLARYNGOLOGY

## 2025-04-22 PROCEDURE — 64907 NERVE PEDICLE TRANSFER: CPT | Performed by: OTOLARYNGOLOGY

## 2025-04-22 PROCEDURE — 35701 EXPL N/FLWD SURG NECK ART: CPT | Performed by: OTOLARYNGOLOGY

## 2025-04-22 DEVICE — THE GEM MICROVASCULAR ANASTOMOTIC COUPLER DEVICE RINGS ARE MADE OF POLYETHYLENE AND SURGICAL GRADE STAINLESS STEEL PINS. A PROTECTIVE COVER AND JAW ASSEMBLY PROTECT THE RINGS AND ALLOW FOR EASY LOADING ONTO THE ANASTOMOTIC INSTRUMENT. BOTH THE PROTECTIVE COVER AND JAW ASSEMBLY ARE DISPOSABLE. THE GEM MICROVASCULAR ANASTOMOTIC COUPLER DEVICE IS SINGLE-USE AND AVAILABLE IN VARIOUS SIZES
Type: IMPLANTABLE DEVICE | Site: NECK | Status: FUNCTIONAL
Brand: GEM MICROVASCULAR ANASTOMOTIC COUPLER

## 2025-04-22 RX ORDER — PANTOPRAZOLE SODIUM 40 MG/1
40 TABLET, DELAYED RELEASE ORAL
Status: DISCONTINUED | OUTPATIENT
Start: 2025-04-23 | End: 2025-04-23

## 2025-04-22 RX ORDER — ACETAMINOPHEN 325 MG/1
975 TABLET ORAL EVERY 8 HOURS SCHEDULED
Status: DISCONTINUED | OUTPATIENT
Start: 2025-04-22 | End: 2025-04-24 | Stop reason: HOSPADM

## 2025-04-22 RX ORDER — POLYETHYLENE GLYCOL 3350 17 G/17G
17 POWDER, FOR SOLUTION ORAL DAILY
Status: DISCONTINUED | OUTPATIENT
Start: 2025-04-22 | End: 2025-04-24 | Stop reason: HOSPADM

## 2025-04-22 RX ORDER — ACETAMINOPHEN 325 MG/1
650 TABLET ORAL EVERY 4 HOURS PRN
Status: DISCONTINUED | OUTPATIENT
Start: 2025-04-22 | End: 2025-04-22 | Stop reason: HOSPADM

## 2025-04-22 RX ORDER — DROPERIDOL 2.5 MG/ML
0.62 INJECTION, SOLUTION INTRAMUSCULAR; INTRAVENOUS ONCE AS NEEDED
Status: DISCONTINUED | OUTPATIENT
Start: 2025-04-22 | End: 2025-04-22 | Stop reason: HOSPADM

## 2025-04-22 RX ORDER — GLYCOPYRROLATE 0.2 MG/ML
INJECTION INTRAMUSCULAR; INTRAVENOUS AS NEEDED
Status: DISCONTINUED | OUTPATIENT
Start: 2025-04-22 | End: 2025-04-22

## 2025-04-22 RX ORDER — LIDOCAINE HYDROCHLORIDE 20 MG/ML
INJECTION, SOLUTION INFILTRATION; PERINEURAL AS NEEDED
Status: DISCONTINUED | OUTPATIENT
Start: 2025-04-22 | End: 2025-04-22

## 2025-04-22 RX ORDER — SODIUM CHLORIDE 9 MG/ML
80 INJECTION, SOLUTION INTRAVENOUS CONTINUOUS
Status: ACTIVE | OUTPATIENT
Start: 2025-04-22 | End: 2025-04-23

## 2025-04-22 RX ORDER — BACITRACIN 500 [USP'U]/G
OINTMENT TOPICAL AS NEEDED
Status: DISCONTINUED | OUTPATIENT
Start: 2025-04-22 | End: 2025-04-22 | Stop reason: HOSPADM

## 2025-04-22 RX ORDER — ROCURONIUM BROMIDE 10 MG/ML
INJECTION, SOLUTION INTRAVENOUS AS NEEDED
Status: DISCONTINUED | OUTPATIENT
Start: 2025-04-22 | End: 2025-04-22

## 2025-04-22 RX ORDER — ALBUMIN HUMAN 50 G/1000ML
SOLUTION INTRAVENOUS AS NEEDED
Status: DISCONTINUED | OUTPATIENT
Start: 2025-04-22 | End: 2025-04-22

## 2025-04-22 RX ORDER — ONDANSETRON 4 MG/1
4 TABLET, FILM COATED ORAL EVERY 8 HOURS PRN
Status: DISCONTINUED | OUTPATIENT
Start: 2025-04-22 | End: 2025-04-24 | Stop reason: HOSPADM

## 2025-04-22 RX ORDER — ONDANSETRON HYDROCHLORIDE 2 MG/ML
4 INJECTION, SOLUTION INTRAVENOUS ONCE AS NEEDED
Status: DISCONTINUED | OUTPATIENT
Start: 2025-04-22 | End: 2025-04-22 | Stop reason: HOSPADM

## 2025-04-22 RX ORDER — MAGNESIUM SULFATE HEPTAHYDRATE 500 MG/ML
INJECTION, SOLUTION INTRAMUSCULAR; INTRAVENOUS AS NEEDED
Status: DISCONTINUED | OUTPATIENT
Start: 2025-04-22 | End: 2025-04-22

## 2025-04-22 RX ORDER — OXYMETAZOLINE HCL 0.05 %
SPRAY, NON-AEROSOL (ML) NASAL AS NEEDED
Status: DISCONTINUED | OUTPATIENT
Start: 2025-04-22 | End: 2025-04-22

## 2025-04-22 RX ORDER — OXYCODONE HYDROCHLORIDE 5 MG/1
5 TABLET ORAL EVERY 4 HOURS PRN
Status: DISCONTINUED | OUTPATIENT
Start: 2025-04-22 | End: 2025-04-22 | Stop reason: HOSPADM

## 2025-04-22 RX ORDER — HYDROMORPHONE HCL/0.9% NACL/PF 15 MG/30ML
PATIENT CONTROLLED ANALGESIA SYRINGE INTRAVENOUS CONTINUOUS
Status: DISCONTINUED | OUTPATIENT
Start: 2025-04-22 | End: 2025-04-23

## 2025-04-22 RX ORDER — HYDROMORPHONE HYDROCHLORIDE 0.2 MG/ML
0.1 INJECTION INTRAMUSCULAR; INTRAVENOUS; SUBCUTANEOUS EVERY 5 MIN PRN
Status: DISCONTINUED | OUTPATIENT
Start: 2025-04-22 | End: 2025-04-22 | Stop reason: HOSPADM

## 2025-04-22 RX ORDER — HYDROGEN PEROXIDE 3 %
1 SOLUTION, NON-ORAL MISCELLANEOUS 3 TIMES DAILY
Status: DISCONTINUED | OUTPATIENT
Start: 2025-04-22 | End: 2025-04-24 | Stop reason: HOSPADM

## 2025-04-22 RX ORDER — MIDAZOLAM HYDROCHLORIDE 1 MG/ML
INJECTION INTRAMUSCULAR; INTRAVENOUS AS NEEDED
Status: DISCONTINUED | OUTPATIENT
Start: 2025-04-22 | End: 2025-04-22

## 2025-04-22 RX ORDER — ACETAMINOPHEN 160 MG/5ML
1000 SOLUTION ORAL EVERY 8 HOURS SCHEDULED
Status: DISCONTINUED | OUTPATIENT
Start: 2025-04-22 | End: 2025-04-24 | Stop reason: HOSPADM

## 2025-04-22 RX ORDER — POTASSIUM CHLORIDE 14.9 MG/ML
INJECTION INTRAVENOUS AS NEEDED
Status: DISCONTINUED | OUTPATIENT
Start: 2025-04-22 | End: 2025-04-22

## 2025-04-22 RX ORDER — HEPARIN SODIUM 5000 [USP'U]/ML
5000 INJECTION, SOLUTION INTRAVENOUS; SUBCUTANEOUS EVERY 8 HOURS
Status: DISCONTINUED | OUTPATIENT
Start: 2025-04-22 | End: 2025-04-24 | Stop reason: HOSPADM

## 2025-04-22 RX ORDER — SODIUM CHLORIDE 0.9 G/100ML
INJECTION, SOLUTION IRRIGATION AS NEEDED
Status: DISCONTINUED | OUTPATIENT
Start: 2025-04-22 | End: 2025-04-22 | Stop reason: HOSPADM

## 2025-04-22 RX ORDER — PHENYLEPHRINE HYDROCHLORIDE 10 MG/ML
INJECTION INTRAVENOUS AS NEEDED
Status: DISCONTINUED | OUTPATIENT
Start: 2025-04-22 | End: 2025-04-22

## 2025-04-22 RX ORDER — PETROLATUM 420 MG/G
1 OINTMENT TOPICAL 3 TIMES DAILY
Status: DISCONTINUED | OUTPATIENT
Start: 2025-04-24 | End: 2025-04-24 | Stop reason: HOSPADM

## 2025-04-22 RX ORDER — FENTANYL CITRATE 50 UG/ML
INJECTION, SOLUTION INTRAMUSCULAR; INTRAVENOUS AS NEEDED
Status: DISCONTINUED | OUTPATIENT
Start: 2025-04-22 | End: 2025-04-22

## 2025-04-22 RX ORDER — LIDOCAINE HYDROCHLORIDE 20 MG/ML
INJECTION, SOLUTION INFILTRATION; PERINEURAL AS NEEDED
Status: DISCONTINUED | OUTPATIENT
Start: 2025-04-22 | End: 2025-04-22 | Stop reason: HOSPADM

## 2025-04-22 RX ORDER — APREPITANT 40 MG/1
CAPSULE ORAL AS NEEDED
Status: DISCONTINUED | OUTPATIENT
Start: 2025-04-22 | End: 2025-04-22

## 2025-04-22 RX ORDER — SERTRALINE HYDROCHLORIDE 50 MG/1
50 TABLET, FILM COATED ORAL DAILY
Status: DISCONTINUED | OUTPATIENT
Start: 2025-04-22 | End: 2025-04-24 | Stop reason: HOSPADM

## 2025-04-22 RX ORDER — BACITRACIN ZINC 500 UNIT/G
OINTMENT IN PACKET (EA) TOPICAL 3 TIMES DAILY
Status: DISCONTINUED | OUTPATIENT
Start: 2025-04-22 | End: 2025-04-24 | Stop reason: HOSPADM

## 2025-04-22 RX ORDER — CALCIUM CHLORIDE INJECTION 100 MG/ML
INJECTION, SOLUTION INTRAVENOUS AS NEEDED
Status: DISCONTINUED | OUTPATIENT
Start: 2025-04-22 | End: 2025-04-22

## 2025-04-22 RX ORDER — ASPIRIN 325 MG
325 TABLET ORAL DAILY
Status: CANCELLED | OUTPATIENT
Start: 2025-04-22 | End: 2025-05-22

## 2025-04-22 RX ORDER — CEFAZOLIN 1 G/1
INJECTION, POWDER, FOR SOLUTION INTRAVENOUS AS NEEDED
Status: DISCONTINUED | OUTPATIENT
Start: 2025-04-22 | End: 2025-04-22

## 2025-04-22 RX ORDER — HYDROMORPHONE HYDROCHLORIDE 1 MG/ML
INJECTION, SOLUTION INTRAMUSCULAR; INTRAVENOUS; SUBCUTANEOUS AS NEEDED
Status: DISCONTINUED | OUTPATIENT
Start: 2025-04-22 | End: 2025-04-22

## 2025-04-22 RX ORDER — SODIUM CHLORIDE, SODIUM LACTATE, POTASSIUM CHLORIDE, CALCIUM CHLORIDE 600; 310; 30; 20 MG/100ML; MG/100ML; MG/100ML; MG/100ML
50 INJECTION, SOLUTION INTRAVENOUS CONTINUOUS
Status: ACTIVE | OUTPATIENT
Start: 2025-04-22 | End: 2025-04-22

## 2025-04-22 RX ORDER — LIDOCAINE HYDROCHLORIDE 10 MG/ML
0.1 INJECTION, SOLUTION INFILTRATION; PERINEURAL ONCE
Status: DISCONTINUED | OUTPATIENT
Start: 2025-04-22 | End: 2025-04-22 | Stop reason: HOSPADM

## 2025-04-22 RX ORDER — ONDANSETRON HYDROCHLORIDE 2 MG/ML
INJECTION, SOLUTION INTRAVENOUS AS NEEDED
Status: DISCONTINUED | OUTPATIENT
Start: 2025-04-22 | End: 2025-04-22

## 2025-04-22 RX ORDER — NORETHINDRONE AND ETHINYL ESTRADIOL 0.5-0.035
KIT ORAL AS NEEDED
Status: DISCONTINUED | OUTPATIENT
Start: 2025-04-22 | End: 2025-04-22

## 2025-04-22 RX ORDER — PROPOFOL 10 MG/ML
INJECTION, EMULSION INTRAVENOUS AS NEEDED
Status: DISCONTINUED | OUTPATIENT
Start: 2025-04-22 | End: 2025-04-22

## 2025-04-22 RX ORDER — ONDANSETRON HYDROCHLORIDE 2 MG/ML
4 INJECTION, SOLUTION INTRAVENOUS EVERY 8 HOURS PRN
Status: DISCONTINUED | OUTPATIENT
Start: 2025-04-22 | End: 2025-04-24 | Stop reason: HOSPADM

## 2025-04-22 RX ORDER — LIDOCAINE HYDROCHLORIDE AND EPINEPHRINE 10; 10 UG/ML; MG/ML
INJECTION, SOLUTION INFILTRATION; PERINEURAL AS NEEDED
Status: DISCONTINUED | OUTPATIENT
Start: 2025-04-22 | End: 2025-04-22 | Stop reason: HOSPADM

## 2025-04-22 RX ORDER — NALOXONE HYDROCHLORIDE 0.4 MG/ML
0.2 INJECTION, SOLUTION INTRAMUSCULAR; INTRAVENOUS; SUBCUTANEOUS EVERY 5 MIN PRN
Status: DISCONTINUED | OUTPATIENT
Start: 2025-04-22 | End: 2025-04-24 | Stop reason: HOSPADM

## 2025-04-22 RX ORDER — AMOXICILLIN AND CLAVULANATE POTASSIUM 875; 125 MG/1; MG/1
1 TABLET, FILM COATED ORAL EVERY 12 HOURS SCHEDULED
Status: DISCONTINUED | OUTPATIENT
Start: 2025-04-24 | End: 2025-04-24 | Stop reason: HOSPADM

## 2025-04-22 RX ADMIN — REMIFENTANIL HYDROCHLORIDE 0.1 MCG/KG/MIN: 1 INJECTION, POWDER, LYOPHILIZED, FOR SOLUTION INTRAVENOUS at 08:45

## 2025-04-22 RX ADMIN — CALCIUM CHLORIDE 0.5 G: 100 INJECTION INTRAVENOUS; INTRAVENTRICULAR at 08:50

## 2025-04-22 RX ADMIN — HYDROMORPHONE HYDROCHLORIDE 0.5 MG: 1 INJECTION, SOLUTION INTRAMUSCULAR; INTRAVENOUS; SUBCUTANEOUS at 15:30

## 2025-04-22 RX ADMIN — Medication 25 MG: at 08:18

## 2025-04-22 RX ADMIN — BACITRACIN 1 APPLICATION: 500 OINTMENT TOPICAL at 20:32

## 2025-04-22 RX ADMIN — MIDAZOLAM HYDROCHLORIDE 1 MG: 2 INJECTION, SOLUTION INTRAMUSCULAR; INTRAVENOUS at 07:47

## 2025-04-22 RX ADMIN — Medication 25 MG: at 08:35

## 2025-04-22 RX ADMIN — PHENYLEPHRINE HYDROCHLORIDE 80 MCG: 10 INJECTION INTRAVENOUS at 10:53

## 2025-04-22 RX ADMIN — PHENYLEPHRINE HYDROCHLORIDE 160 MCG: 10 INJECTION INTRAVENOUS at 08:32

## 2025-04-22 RX ADMIN — SERTRALINE 50 MG: 50 TABLET, FILM COATED ORAL at 20:29

## 2025-04-22 RX ADMIN — HEPARIN SODIUM 5000 UNITS: 5000 INJECTION, SOLUTION INTRAVENOUS; SUBCUTANEOUS at 18:41

## 2025-04-22 RX ADMIN — CEFAZOLIN 2 G: 330 INJECTION, POWDER, FOR SOLUTION INTRAMUSCULAR; INTRAVENOUS at 12:30

## 2025-04-22 RX ADMIN — ONDANSETRON 4 MG: 2 INJECTION INTRAMUSCULAR; INTRAVENOUS at 15:28

## 2025-04-22 RX ADMIN — SODIUM CHLORIDE, POTASSIUM CHLORIDE, SODIUM LACTATE AND CALCIUM CHLORIDE 250 ML: 600; 310; 30; 20 INJECTION, SOLUTION INTRAVENOUS at 16:56

## 2025-04-22 RX ADMIN — POTASSIUM CHLORIDE 20 MEQ: 14.9 INJECTION, SOLUTION INTRAVENOUS at 11:45

## 2025-04-22 RX ADMIN — CEFAZOLIN 2 G: 330 INJECTION, POWDER, FOR SOLUTION INTRAMUSCULAR; INTRAVENOUS at 08:30

## 2025-04-22 RX ADMIN — FENTANYL CITRATE 50 MCG: 50 INJECTION, SOLUTION INTRAMUSCULAR; INTRAVENOUS at 07:50

## 2025-04-22 RX ADMIN — ACETAMINOPHEN 975 MG: 325 TABLET, FILM COATED ORAL at 20:30

## 2025-04-22 RX ADMIN — PHENYLEPHRINE HYDROCHLORIDE 120 MCG: 10 INJECTION INTRAVENOUS at 08:46

## 2025-04-22 RX ADMIN — OXYMETAZOLINE HYDROCHLORIDE 2 SPRAY: 0.05 SPRAY NASAL at 07:55

## 2025-04-22 RX ADMIN — ALBUMIN HUMAN 250 ML: 0.05 INJECTION, SOLUTION INTRAVENOUS at 09:22

## 2025-04-22 RX ADMIN — APREPITANT 40 MG: 40 CAPSULE ORAL at 07:00

## 2025-04-22 RX ADMIN — MAGNESIUM SULFATE HEPTAHYDRATE 1 G: 500 INJECTION, SOLUTION INTRAMUSCULAR; INTRAVENOUS at 09:02

## 2025-04-22 RX ADMIN — FENTANYL CITRATE 50 MCG: 50 INJECTION, SOLUTION INTRAMUSCULAR; INTRAVENOUS at 08:07

## 2025-04-22 RX ADMIN — GLYCOPYRROLATE 0.2 MG: 0.2 INJECTION, SOLUTION INTRAMUSCULAR; INTRAVENOUS at 08:32

## 2025-04-22 RX ADMIN — SODIUM CHLORIDE, SODIUM LACTATE, POTASSIUM CHLORIDE, AND CALCIUM CHLORIDE: 600; 310; 30; 20 INJECTION, SOLUTION INTRAVENOUS at 07:42

## 2025-04-22 RX ADMIN — PHENYLEPHRINE HYDROCHLORIDE 80 MCG: 10 INJECTION INTRAVENOUS at 11:08

## 2025-04-22 RX ADMIN — ALBUMIN HUMAN 250 ML: 0.05 INJECTION, SOLUTION INTRAVENOUS at 10:38

## 2025-04-22 RX ADMIN — GLYCOPYRROLATE 0.1 MG: 0.2 INJECTION, SOLUTION INTRAMUSCULAR; INTRAVENOUS at 07:42

## 2025-04-22 RX ADMIN — EPHEDRINE SULFATE 25 MG: 50 INJECTION, SOLUTION INTRAVENOUS at 09:37

## 2025-04-22 RX ADMIN — PHENYLEPHRINE HYDROCHLORIDE 120 MCG: 10 INJECTION INTRAVENOUS at 08:51

## 2025-04-22 RX ADMIN — MIDAZOLAM HYDROCHLORIDE 1 MG: 2 INJECTION, SOLUTION INTRAMUSCULAR; INTRAVENOUS at 07:42

## 2025-04-22 RX ADMIN — HYDROMORPHONE HYDROCHLORIDE 0.5 MG: 0.5 INJECTION, SOLUTION INTRAMUSCULAR; INTRAVENOUS; SUBCUTANEOUS at 15:54

## 2025-04-22 RX ADMIN — PHENYLEPHRINE HYDROCHLORIDE 120 MCG: 10 INJECTION INTRAVENOUS at 08:54

## 2025-04-22 RX ADMIN — HYDROMORPHONE HYDROCHLORIDE 0.5 MG: 0.5 INJECTION, SOLUTION INTRAMUSCULAR; INTRAVENOUS; SUBCUTANEOUS at 16:06

## 2025-04-22 RX ADMIN — PROPOFOL 150 MG: 10 INJECTION, EMULSION INTRAVENOUS at 07:52

## 2025-04-22 RX ADMIN — AMPICILLIN SODIUM AND SULBACTAM SODIUM 3 G: 2; 1 INJECTION, POWDER, FOR SOLUTION INTRAMUSCULAR; INTRAVENOUS at 18:41

## 2025-04-22 RX ADMIN — PHENYLEPHRINE HYDROCHLORIDE 160 MCG: 10 INJECTION INTRAVENOUS at 09:06

## 2025-04-22 RX ADMIN — PHENYLEPHRINE HYDROCHLORIDE 80 MCG: 10 INJECTION INTRAVENOUS at 11:17

## 2025-04-22 RX ADMIN — HYDROMORPHONE HYDROCHLORIDE 0.5 MG: 1 INJECTION, SOLUTION INTRAMUSCULAR; INTRAVENOUS; SUBCUTANEOUS at 15:41

## 2025-04-22 RX ADMIN — HYDROMORPHONE HYDROCHLORIDE 0.5 MG: 0.5 INJECTION, SOLUTION INTRAMUSCULAR; INTRAVENOUS; SUBCUTANEOUS at 16:13

## 2025-04-22 RX ADMIN — ROCURONIUM 35 MG: 50 INJECTION, SOLUTION INTRAVENOUS at 07:53

## 2025-04-22 RX ADMIN — PHENYLEPHRINE HYDROCHLORIDE 120 MCG: 10 INJECTION INTRAVENOUS at 08:59

## 2025-04-22 RX ADMIN — DEXAMETHASONE SODIUM PHOSPHATE 10 MG: 4 INJECTION, SOLUTION INTRA-ARTICULAR; INTRALESIONAL; INTRAMUSCULAR; INTRAVENOUS; SOFT TISSUE at 08:05

## 2025-04-22 RX ADMIN — ALBUMIN HUMAN 250 ML: 0.05 INJECTION, SOLUTION INTRAVENOUS at 15:03

## 2025-04-22 RX ADMIN — EPHEDRINE SULFATE 25 MG: 50 INJECTION, SOLUTION INTRAVENOUS at 09:18

## 2025-04-22 RX ADMIN — LIDOCAINE HYDROCHLORIDE 50 MG: 20 INJECTION, SOLUTION INFILTRATION; PERINEURAL at 07:50

## 2025-04-22 RX ADMIN — Medication: at 16:25

## 2025-04-22 RX ADMIN — HYDROGEN PEROXIDE 1 APPLICATION: 30 SOLUTION TOPICAL at 20:32

## 2025-04-22 RX ADMIN — ALBUMIN HUMAN 250 ML: 0.05 INJECTION, SOLUTION INTRAVENOUS at 08:39

## 2025-04-22 RX ADMIN — SODIUM CHLORIDE 80 ML/HR: 9 INJECTION, SOLUTION INTRAVENOUS at 18:42

## 2025-04-22 SDOH — SOCIAL STABILITY: SOCIAL INSECURITY
WITHIN THE LAST YEAR, HAVE YOU BEEN KICKED, HIT, SLAPPED, OR OTHERWISE PHYSICALLY HURT BY YOUR PARTNER OR EX-PARTNER?: NO

## 2025-04-22 SDOH — SOCIAL STABILITY: SOCIAL INSECURITY: WITHIN THE LAST YEAR, HAVE YOU BEEN HUMILIATED OR EMOTIONALLY ABUSED IN OTHER WAYS BY YOUR PARTNER OR EX-PARTNER?: NO

## 2025-04-22 SDOH — ECONOMIC STABILITY: FOOD INSECURITY: WITHIN THE PAST 12 MONTHS, YOU WORRIED THAT YOUR FOOD WOULD RUN OUT BEFORE YOU GOT THE MONEY TO BUY MORE.: NEVER TRUE

## 2025-04-22 SDOH — ECONOMIC STABILITY: INCOME INSECURITY: IN THE PAST 12 MONTHS HAS THE ELECTRIC, GAS, OIL, OR WATER COMPANY THREATENED TO SHUT OFF SERVICES IN YOUR HOME?: NO

## 2025-04-22 SDOH — SOCIAL STABILITY: SOCIAL INSECURITY: WITHIN THE LAST YEAR, HAVE YOU BEEN AFRAID OF YOUR PARTNER OR EX-PARTNER?: NO

## 2025-04-22 SDOH — SOCIAL STABILITY: SOCIAL INSECURITY: ARE YOU OR HAVE YOU BEEN THREATENED OR ABUSED PHYSICALLY, EMOTIONALLY, OR SEXUALLY BY ANYONE?: NO

## 2025-04-22 SDOH — SOCIAL STABILITY: SOCIAL INSECURITY: ARE THERE ANY APPARENT SIGNS OF INJURIES/BEHAVIORS THAT COULD BE RELATED TO ABUSE/NEGLECT?: NO

## 2025-04-22 SDOH — SOCIAL STABILITY: SOCIAL INSECURITY
WITHIN THE LAST YEAR, HAVE YOU BEEN RAPED OR FORCED TO HAVE ANY KIND OF SEXUAL ACTIVITY BY YOUR PARTNER OR EX-PARTNER?: NO

## 2025-04-22 SDOH — ECONOMIC STABILITY: FOOD INSECURITY: WITHIN THE PAST 12 MONTHS, THE FOOD YOU BOUGHT JUST DIDN'T LAST AND YOU DIDN'T HAVE MONEY TO GET MORE.: NEVER TRUE

## 2025-04-22 SDOH — SOCIAL STABILITY: SOCIAL INSECURITY: HAS ANYONE EVER THREATENED TO HURT YOUR FAMILY OR YOUR PETS?: NO

## 2025-04-22 SDOH — SOCIAL STABILITY: SOCIAL INSECURITY: DO YOU FEEL UNSAFE GOING BACK TO THE PLACE WHERE YOU ARE LIVING?: NO

## 2025-04-22 SDOH — SOCIAL STABILITY: SOCIAL INSECURITY: DO YOU FEEL ANYONE HAS EXPLOITED OR TAKEN ADVANTAGE OF YOU FINANCIALLY OR OF YOUR PERSONAL PROPERTY?: NO

## 2025-04-22 SDOH — SOCIAL STABILITY: SOCIAL INSECURITY: DOES ANYONE TRY TO KEEP YOU FROM HAVING/CONTACTING OTHER FRIENDS OR DOING THINGS OUTSIDE YOUR HOME?: NO

## 2025-04-22 SDOH — SOCIAL STABILITY: SOCIAL INSECURITY: HAVE YOU HAD ANY THOUGHTS OF HARMING ANYONE ELSE?: NO

## 2025-04-22 SDOH — SOCIAL STABILITY: SOCIAL INSECURITY: ABUSE: ADULT

## 2025-04-22 SDOH — SOCIAL STABILITY: SOCIAL INSECURITY: WERE YOU ABLE TO COMPLETE ALL THE BEHAVIORAL HEALTH SCREENINGS?: YES

## 2025-04-22 SDOH — HEALTH STABILITY: MENTAL HEALTH: CURRENT SMOKER: 0

## 2025-04-22 SDOH — SOCIAL STABILITY: SOCIAL INSECURITY: HAVE YOU HAD THOUGHTS OF HARMING ANYONE ELSE?: NO

## 2025-04-22 ASSESSMENT — PAIN SCALES - GENERAL
PAINLEVEL_OUTOF10: 8
PAINLEVEL_OUTOF10: 8
PAINLEVEL_OUTOF10: 2
PAINLEVEL_OUTOF10: 0 - NO PAIN
PAINLEVEL_OUTOF10: 8
PAINLEVEL_OUTOF10: 4
PAINLEVEL_OUTOF10: 8
PAINLEVEL_OUTOF10: 2
PAINLEVEL_OUTOF10: 0 - NO PAIN
PAINLEVEL_OUTOF10: 8
PAINLEVEL_OUTOF10: 6
PAIN_LEVEL: 2
PAINLEVEL_OUTOF10: 6

## 2025-04-22 ASSESSMENT — COGNITIVE AND FUNCTIONAL STATUS - GENERAL
PATIENT BASELINE BEDBOUND: NO
MOBILITY SCORE: 24
DAILY ACTIVITIY SCORE: 24

## 2025-04-22 ASSESSMENT — PAIN - FUNCTIONAL ASSESSMENT
PAIN_FUNCTIONAL_ASSESSMENT: 0-10
PAIN_FUNCTIONAL_ASSESSMENT: UNABLE TO SELF-REPORT
PAIN_FUNCTIONAL_ASSESSMENT: UNABLE TO SELF-REPORT
PAIN_FUNCTIONAL_ASSESSMENT: 0-10

## 2025-04-22 ASSESSMENT — ACTIVITIES OF DAILY LIVING (ADL)
LACK_OF_TRANSPORTATION: NO
JUDGMENT_ADEQUATE_SAFELY_COMPLETE_DAILY_ACTIVITIES: YES
HEARING - LEFT EAR: FUNCTIONAL
HEARING - RIGHT EAR: FUNCTIONAL
TOILETING: INDEPENDENT
DRESSING YOURSELF: INDEPENDENT
WALKS IN HOME: INDEPENDENT
PATIENT'S MEMORY ADEQUATE TO SAFELY COMPLETE DAILY ACTIVITIES?: YES
ADEQUATE_TO_COMPLETE_ADL: YES
GROOMING: INDEPENDENT
FEEDING YOURSELF: INDEPENDENT
BATHING: INDEPENDENT

## 2025-04-22 ASSESSMENT — PATIENT HEALTH QUESTIONNAIRE - PHQ9
1. LITTLE INTEREST OR PLEASURE IN DOING THINGS: NOT AT ALL
SUM OF ALL RESPONSES TO PHQ9 QUESTIONS 1 & 2: 0
2. FEELING DOWN, DEPRESSED OR HOPELESS: NOT AT ALL

## 2025-04-22 ASSESSMENT — LIFESTYLE VARIABLES
HOW OFTEN DO YOU HAVE A DRINK CONTAINING ALCOHOL: MONTHLY OR LESS
AUDIT-C TOTAL SCORE: 1
HOW MANY STANDARD DRINKS CONTAINING ALCOHOL DO YOU HAVE ON A TYPICAL DAY: 1 OR 2
AUDIT-C TOTAL SCORE: 1
SKIP TO QUESTIONS 9-10: 1
HOW OFTEN DO YOU HAVE 6 OR MORE DRINKS ON ONE OCCASION: NEVER

## 2025-04-22 ASSESSMENT — COLUMBIA-SUICIDE SEVERITY RATING SCALE - C-SSRS
2. HAVE YOU ACTUALLY HAD ANY THOUGHTS OF KILLING YOURSELF?: NO
1. IN THE PAST MONTH, HAVE YOU WISHED YOU WERE DEAD OR WISHED YOU COULD GO TO SLEEP AND NOT WAKE UP?: NO
6. HAVE YOU EVER DONE ANYTHING, STARTED TO DO ANYTHING, OR PREPARED TO DO ANYTHING TO END YOUR LIFE?: NO

## 2025-04-22 ASSESSMENT — PAIN DESCRIPTION - LOCATION: LOCATION: LEG

## 2025-04-22 NOTE — ANESTHESIA PROCEDURE NOTES
Airway  Date/Time: 4/22/2025 7:50 AM  Reason: elective      Staffing  Performed: CRNA   Authorized by: Semaj Graf MD    Performed by: Baylee Eric RN  Patient location during procedure: OR    Patient Condition  Indications for airway management: anesthesia  Patient position: sniffing  MILS maintained throughout  Planned trial extubation  Sedation level: deep     Final Airway Details   Preoxygenated: yes  Final airway type: endotracheal airway  Successful airway: AMINTA tube  Cuffed: yes   Successful intubation technique: direct laryngoscopy  Adjuncts used in placement: Magill forceps and NPA  Endotracheal tube insertion site: right naris  Blade: Chelle  Blade size: #3  Cormack-Lehane Classification: grade IIa - partial view of glottis  Placement verified by: chest auscultation, capnometry and palpation of cuff   Measured from: nares  ETT to nares (cm): 26  Number of attempts at approach: 1

## 2025-04-22 NOTE — H&P
History Of Present Illness  Bailey Zamora is a 49 y.o. 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 She is s/p left facial nerve exploration, right parotidectomy, and first stage of cross face nerve transfer for dynamic reconstruction. No significant change to history since last seen in office.     Past Medical History  Medical History[1]    Surgical History  Surgical History[2]     Social History  She reports that she has never smoked. She has never used smokeless tobacco. She reports current alcohol use. She reports that she does not use drugs.    Family History  Family History[3]     Allergies  Patient has no known allergies.    Review of Systems   All other systems reviewed and are negative.       Physical Exam  Constitutional:       Appearance: Normal appearance. She is normal weight.   HENT:      Head: Normocephalic and atraumatic.      Comments: Complete left facial nerve paralysis     Right Ear: External ear normal.      Left Ear: External ear normal.      Nose: Nose normal.      Mouth/Throat:      Mouth: Mucous membranes are moist.      Pharynx: Oropharynx is clear.   Eyes:      Extraocular Movements: Extraocular movements intact.      Conjunctiva/sclera: Conjunctivae normal.      Pupils: Pupils are equal, round, and reactive to light.   Cardiovascular:      Rate and Rhythm: Normal rate and regular rhythm.   Pulmonary:      Effort: Pulmonary effort is normal.      Breath sounds: Normal breath sounds.   Musculoskeletal:      Cervical back: Normal range of motion and neck supple.   Neurological:      Mental Status: She is alert.       Assessment & Plan  Disorder of seventh cranial nerve    Acquired deformity of head    Facial weakness      Patient is a 49 year old female with history of left facial paralysis. Presenting today for second stage of dynamic reanimation. Plan to proceed with surgery as scheduled.        Elia Colon MD          [1]   Past Medical History:  Diagnosis Date    Anxiety     Benign brain tumor (Multi)     OCD (obsessive compulsive disorder)    [2]   Past Surgical History:  Procedure Laterality Date    CRANIOTOMY      EYELID REPAIR W/ SKIN GRAFT     [3] No family history on file.

## 2025-04-22 NOTE — ANESTHESIA PREPROCEDURE EVALUATION
"Patient: Bailey Zamora    Procedure Information       Date/Time: 04/22/25 0700    Procedure: GRACILIS FREE FLAP WITH FACIAL SUSPENSION (Bilateral)    Location: [facility] OR 05 / Virtual [facility] OR    Surgeons: Wilfred Muñoz MD            Relevant Problems   Neuro   (+) Disorder of seventh cranial nerve   (+) Facial paralysis      GYN  [facility] 4/22/25       Clinical information reviewed:     Meds               NPO Detail:  No data recorded       ALLERGIES:  Allergies[1]     MEDICAL HISTORY:  Medical History[2]     Relevant Problems   Neuro   (+) Disorder of seventh cranial nerve   (+) Facial paralysis      GYN  [facility] 4/22/25        SURGICAL HISTORY:  Surgical History[3]     MEDICATIONS:  Current Outpatient Medications   Medication Instructions    bacitracin 500 unit/gram ointment Apply topically to external incisions 2 times a day.    diclofenac sodium 3 % gel Topical, 2 times daily    gabapentin (NEURONTIN) 100 mg, oral, Nightly    honey (Medihoney) gel topical gel 1 Application, Topical, As needed    pantoprazole (PROTONIX) 40 mg, oral, Daily    sertraline (ZOLOFT) 50 mg, oral, Daily        VITALS:      4/22/2025     5:45 AM 3/20/2025     2:36 PM 1/16/2025     2:57 PM   Vitals   Height 1.727 m (5' 8\") 1.727 m (5' 8\") 1.702 m (5' 7\")   Weight (lb) 168.21 179 195   BMI 25.58 kg/m2 27.22 kg/m2 30.54 kg/m2   BSA (m2) 1.91 m2 1.97 m2 2.05 m2   Visit Report  Report Report       LABS:   BMP   Lab Results   Component Value Date    GLUCOSE 87 10/25/2022    CALCIUM 8.9 10/25/2022     10/25/2022    K 4.4 10/25/2022    CO2 23 10/25/2022     10/25/2022    BUN 14 10/25/2022    CREATININE 0.83 10/25/2022   , BMP Extended      No lab exists for component: \"LABALBU\" , LFT No results found for: \"ALT\", \"AST\", \"GGT\", \"ALKPHOS\", \"BILITOT\", MELD Computed MELD 3.0 unavailable. One or more values for this score either were not found within the given timeframe or did not fit some other criterion.  Computed MELD-Na " "unavailable. One or more values for this score either were not found within the given timeframe or did not fit some other criterion.  , CBC  Lab Results   Component Value Date    WBC 7.5 10/25/2022    HGB 12.5 10/25/2022    HCT 37.9 10/25/2022    MCV 96 10/25/2022     10/25/2022          , CBC Extended       Latest Ref Rng & Units 10/25/2022     8:59 AM 7/15/2022     3:26 AM 7/14/2022     3:26 AM 7/13/2022     3:15 AM 7/1/2022    11:19 AM   CBC   Hb 12.0 - 16.0 g/dL 12.5  12.6  12.6  13.1  14.7    Hct 36.0 - 46.0 % 37.9  37.7  37.5  37.5  43.4    MCV 80 - 100 fL 96  96  95  94  96    RDW 11.5 - 14.5 % 12.5  13.5  13.6  13.0  13.4      , Coags   Lab Results   Component Value Date/Time    PROTIME 10.9 07/01/2022 1119    INR 0.9 07/01/2022 1119    APTT 30 07/01/2022 1119      , Coags Extendied   , A1C No results found for: \"HGBA1C\", ABG   , ABG Extended          IMAGES:  EKG        No results found for this or any previous visit (from the past 4464 hours).   , ECHO       No results found for this or any previous visit from the past 730 days.    , CARDIAC CATH      No results found for this or any previous visit from the past 730 days.   , CXR     No results found for this or any previous visit from the past 730 days.    , CT Head/Neck     No results found for this or any previous visit from the past 760 days.    , CT Chest      No results found for this or any previous visit from the past 730 days.   , CT Abdomin     No results found for this or any previous visit from the past 730 days.    SOCIAL:  Tobacco Use History[4]   Social History     Substance and Sexual Activity   Alcohol Use Yes    Comment: social      Social History     Substance and Sexual Activity   Drug Use Never        NPO STATUS:  No data recorded    Clinical Areas Reviewed:     Meds               Anesthesia Assessment:    Physical Exam    Airway  Mallampati: I  TM distance: >3 FB  Neck ROM: full  Mouth opening: 3 or more finger widths   "   Cardiovascular   Rhythm: regular  Rate: normal     Dental - normal exam     Pulmonary - normal examBreath sounds clear to auscultation     Abdominal            Anesthesia Plan    History of general anesthesia?: yes  History of complications of general anesthesia?: no    ASA 2     general     The patient is not a current smoker.  Patient did not smoke on day of procedure.    Anesthetic plan and risks discussed with patient.  Use of blood products discussed with patient who consented to blood products.    Plan discussed with CRNA.               [1] No Known Allergies  [2]   Past Medical History:  Diagnosis Date    Anxiety     Benign brain tumor (Multi)     OCD (obsessive compulsive disorder)    [3]   Past Surgical History:  Procedure Laterality Date    CRANIOTOMY     [4]   Social History  Tobacco Use   Smoking Status Never   Smokeless Tobacco Never

## 2025-04-22 NOTE — ANESTHESIA POSTPROCEDURE EVALUATION
Patient: Bailey Zamora    Procedure Summary       Date: 04/22/25 Room / Location: [facility] OR 05 / Virtual [facility]    Anesthesia Start: 0740 Anesthesia Stop: 1545    Procedure: GRACILIS FREE FLAP WITH FACIAL SUSPENSION (Bilateral) Diagnosis:       Disorder of seventh cranial nerve      Acquired deformity of head      Facial weakness      (Disorder of seventh cranial nerve [G51.9])      (Acquired deformity of head [M95.2])      (Facial weakness [R29.810])    Surgeons: Wilfred Muñoz MD Responsible Provider: Semaj Graf MD    Anesthesia Type: general ASA Status: 2            Anesthesia Type: general    Vitals Value Taken Time   /59 04/22/25 15:45   Temp 36.9 °C (98.4 °F) 04/22/25 15:40   Pulse 127 04/22/25 15:50   Resp 15 04/22/25 15:50   SpO2 94 % 04/22/25 15:50   Vitals shown include unfiled device data.    Anesthesia Post Evaluation    Patient location during evaluation: PACU  Patient participation: complete - patient participated  Level of consciousness: sleepy but conscious  Pain score: 2  Pain management: adequate  Airway patency: patent  Cardiovascular status: acceptable and stable  Respiratory status: acceptable, face mask and spontaneous ventilation  Hydration status: acceptable  Postoperative Nausea and Vomiting: none        There were no known notable events for this encounter.

## 2025-04-22 NOTE — CARE PLAN
The patient's goals for the shift include      The clinical goals for the shift include patient will not fall throughout shift      Problem: Pain - Adult  Goal: Verbalizes/displays adequate comfort level or baseline comfort level  Outcome: Progressing     Problem: Safety - Adult  Goal: Free from fall injury  Outcome: Progressing     Problem: Discharge Planning  Goal: Discharge to home or other facility with appropriate resources  Outcome: Progressing     Problem: Chronic Conditions and Co-morbidities  Goal: Patient's chronic conditions and co-morbidity symptoms are monitored and maintained or improved  Outcome: Progressing     Problem: Fall/Injury  Goal: Not fall by end of shift  Outcome: Progressing  Goal: Be free from injury by end of the shift  Outcome: Progressing  Goal: Verbalize understanding of personal risk factors for fall in the hospital  Outcome: Progressing  Goal: Verbalize understanding of risk factor reduction measures to prevent injury from fall in the home  Outcome: Progressing  Goal: Use assistive devices by end of the shift  Outcome: Progressing  Goal: Pace activities to prevent fatigue by end of the shift  Outcome: Progressing

## 2025-04-22 NOTE — ANESTHESIA PROCEDURE NOTES
Arterial Line:    Date/Time: 4/22/2025 8:18 AM    Staffing  Performed: attending   Authorized by: Semaj Graf MD    Performed by: Baylee Eric RN    An arterial line was placed. in the OR for the following indication(s): continuous blood pressure monitoring.    A 20 gauge (size), 1 and 3/4 inch (length), Angiocath (type) catheter was placed into the Left radial artery, secured by Tegaderm,   Seldinger technique used.  Events:  patient tolerated procedure well with no complications.

## 2025-04-23 LAB
ALBUMIN SERPL BCP-MCNC: 4 G/DL (ref 3.4–5)
ANION GAP SERPL CALC-SCNC: 11 MMOL/L (ref 10–20)
BUN SERPL-MCNC: 8 MG/DL (ref 6–23)
CALCIUM SERPL-MCNC: 7.9 MG/DL (ref 8.6–10.6)
CHLORIDE SERPL-SCNC: 105 MMOL/L (ref 98–107)
CO2 SERPL-SCNC: 27 MMOL/L (ref 21–32)
CREAT SERPL-MCNC: 0.74 MG/DL (ref 0.5–1.05)
EGFRCR SERPLBLD CKD-EPI 2021: >90 ML/MIN/1.73M*2
ERYTHROCYTE [DISTWIDTH] IN BLOOD BY AUTOMATED COUNT: 19.7 % (ref 11.5–14.5)
GLUCOSE BLD MANUAL STRIP-MCNC: 85 MG/DL (ref 74–99)
GLUCOSE SERPL-MCNC: 85 MG/DL (ref 74–99)
HCT VFR BLD AUTO: 27.5 % (ref 36–46)
HGB BLD-MCNC: 8.7 G/DL (ref 12–16)
MAGNESIUM SERPL-MCNC: 2.12 MG/DL (ref 1.6–2.4)
MCH RBC QN AUTO: 24.9 PG (ref 26–34)
MCHC RBC AUTO-ENTMCNC: 31.6 G/DL (ref 32–36)
MCV RBC AUTO: 79 FL (ref 80–100)
NRBC BLD-RTO: 0 /100 WBCS (ref 0–0)
PHOSPHATE SERPL-MCNC: 3 MG/DL (ref 2.5–4.9)
PLATELET # BLD AUTO: 263 X10*3/UL (ref 150–450)
POTASSIUM SERPL-SCNC: 3.9 MMOL/L (ref 3.5–5.3)
RBC # BLD AUTO: 3.49 X10*6/UL (ref 4–5.2)
SODIUM SERPL-SCNC: 139 MMOL/L (ref 136–145)
WBC # BLD AUTO: 11.9 X10*3/UL (ref 4.4–11.3)

## 2025-04-23 PROCEDURE — 82947 ASSAY GLUCOSE BLOOD QUANT: CPT

## 2025-04-23 PROCEDURE — 2500000001 HC RX 250 WO HCPCS SELF ADMINISTERED DRUGS (ALT 637 FOR MEDICARE OP): Performed by: STUDENT IN AN ORGANIZED HEALTH CARE EDUCATION/TRAINING PROGRAM

## 2025-04-23 PROCEDURE — 1170000001 HC PRIVATE ONCOLOGY ROOM DAILY

## 2025-04-23 PROCEDURE — 97530 THERAPEUTIC ACTIVITIES: CPT | Mod: GO

## 2025-04-23 PROCEDURE — 85027 COMPLETE CBC AUTOMATED: CPT | Performed by: STUDENT IN AN ORGANIZED HEALTH CARE EDUCATION/TRAINING PROGRAM

## 2025-04-23 PROCEDURE — 2500000002 HC RX 250 W HCPCS SELF ADMINISTERED DRUGS (ALT 637 FOR MEDICARE OP, ALT 636 FOR OP/ED): Performed by: STUDENT IN AN ORGANIZED HEALTH CARE EDUCATION/TRAINING PROGRAM

## 2025-04-23 PROCEDURE — 2500000004 HC RX 250 GENERAL PHARMACY W/ HCPCS (ALT 636 FOR OP/ED)

## 2025-04-23 PROCEDURE — 83735 ASSAY OF MAGNESIUM: CPT | Performed by: STUDENT IN AN ORGANIZED HEALTH CARE EDUCATION/TRAINING PROGRAM

## 2025-04-23 PROCEDURE — 97165 OT EVAL LOW COMPLEX 30 MIN: CPT | Mod: GO

## 2025-04-23 PROCEDURE — 80069 RENAL FUNCTION PANEL: CPT | Performed by: STUDENT IN AN ORGANIZED HEALTH CARE EDUCATION/TRAINING PROGRAM

## 2025-04-23 PROCEDURE — 2500000005 HC RX 250 GENERAL PHARMACY W/O HCPCS: Performed by: STUDENT IN AN ORGANIZED HEALTH CARE EDUCATION/TRAINING PROGRAM

## 2025-04-23 PROCEDURE — 36415 COLL VENOUS BLD VENIPUNCTURE: CPT | Performed by: STUDENT IN AN ORGANIZED HEALTH CARE EDUCATION/TRAINING PROGRAM

## 2025-04-23 PROCEDURE — 2500000004 HC RX 250 GENERAL PHARMACY W/ HCPCS (ALT 636 FOR OP/ED): Performed by: STUDENT IN AN ORGANIZED HEALTH CARE EDUCATION/TRAINING PROGRAM

## 2025-04-23 RX ORDER — OXYCODONE HYDROCHLORIDE 5 MG/1
10 TABLET ORAL EVERY 4 HOURS PRN
Status: DISCONTINUED | OUTPATIENT
Start: 2025-04-23 | End: 2025-04-24 | Stop reason: HOSPADM

## 2025-04-23 RX ORDER — ONDANSETRON 4 MG/1
4 TABLET, ORALLY DISINTEGRATING ORAL EVERY 8 HOURS PRN
Qty: 9 TABLET | Refills: 0 | Status: SHIPPED | OUTPATIENT
Start: 2025-04-23 | End: 2025-04-27

## 2025-04-23 RX ORDER — PANTOPRAZOLE SODIUM 40 MG/1
40 TABLET, DELAYED RELEASE ORAL NIGHTLY
Status: DISCONTINUED | OUTPATIENT
Start: 2025-04-23 | End: 2025-04-24 | Stop reason: HOSPADM

## 2025-04-23 RX ORDER — OXYCODONE HYDROCHLORIDE 5 MG/1
5 TABLET ORAL EVERY 6 HOURS PRN
Refills: 0 | Status: DISCONTINUED | OUTPATIENT
Start: 2025-04-23 | End: 2025-04-23

## 2025-04-23 RX ORDER — AMOXICILLIN 250 MG
1 CAPSULE ORAL DAILY
Qty: 10 TABLET | Refills: 0 | Status: SHIPPED | OUTPATIENT
Start: 2025-04-23 | End: 2025-05-04

## 2025-04-23 RX ORDER — OXYCODONE HYDROCHLORIDE 5 MG/1
10 TABLET ORAL EVERY 6 HOURS PRN
Refills: 0 | Status: DISCONTINUED | OUTPATIENT
Start: 2025-04-23 | End: 2025-04-23

## 2025-04-23 RX ORDER — OXYCODONE HYDROCHLORIDE 5 MG/1
5 TABLET ORAL EVERY 4 HOURS PRN
Status: DISCONTINUED | OUTPATIENT
Start: 2025-04-23 | End: 2025-04-24 | Stop reason: HOSPADM

## 2025-04-23 RX ORDER — AMOXICILLIN AND CLAVULANATE POTASSIUM 875; 125 MG/1; MG/1
1 TABLET, FILM COATED ORAL 2 TIMES DAILY
Qty: 14 TABLET | Refills: 0 | Status: SHIPPED | OUTPATIENT
Start: 2025-04-23 | End: 2025-05-01

## 2025-04-23 RX ORDER — OXYCODONE HYDROCHLORIDE 5 MG/1
5 TABLET ORAL EVERY 6 HOURS PRN
Qty: 20 TABLET | Refills: 0 | Status: SHIPPED | OUTPATIENT
Start: 2025-04-23 | End: 2025-04-29

## 2025-04-23 RX ORDER — HYDROMORPHONE HYDROCHLORIDE 1 MG/ML
0.2 INJECTION, SOLUTION INTRAMUSCULAR; INTRAVENOUS; SUBCUTANEOUS ONCE
Status: COMPLETED | OUTPATIENT
Start: 2025-04-23 | End: 2025-04-23

## 2025-04-23 RX ADMIN — OXYCODONE 10 MG: 5 TABLET ORAL at 17:53

## 2025-04-23 RX ADMIN — HYDROGEN PEROXIDE 1 APPLICATION: 30 SOLUTION TOPICAL at 09:45

## 2025-04-23 RX ADMIN — ACETAMINOPHEN 975 MG: 325 TABLET, FILM COATED ORAL at 13:55

## 2025-04-23 RX ADMIN — OXYCODONE 5 MG: 5 TABLET ORAL at 09:42

## 2025-04-23 RX ADMIN — BACITRACIN 1 APPLICATION: 500 OINTMENT TOPICAL at 09:42

## 2025-04-23 RX ADMIN — HYDROGEN PEROXIDE 1 APPLICATION: 30 SOLUTION TOPICAL at 16:28

## 2025-04-23 RX ADMIN — OXYCODONE 10 MG: 5 TABLET ORAL at 21:53

## 2025-04-23 RX ADMIN — PANTOPRAZOLE SODIUM 40 MG: 40 TABLET, DELAYED RELEASE ORAL at 20:58

## 2025-04-23 RX ADMIN — BACITRACIN 1 APPLICATION: 500 OINTMENT TOPICAL at 20:59

## 2025-04-23 RX ADMIN — HEPARIN SODIUM 5000 UNITS: 5000 INJECTION, SOLUTION INTRAVENOUS; SUBCUTANEOUS at 02:17

## 2025-04-23 RX ADMIN — HYDROMORPHONE HYDROCHLORIDE 0.2 MG: 1 INJECTION, SOLUTION INTRAMUSCULAR; INTRAVENOUS; SUBCUTANEOUS at 20:58

## 2025-04-23 RX ADMIN — BACITRACIN 1 APPLICATION: 500 OINTMENT TOPICAL at 16:28

## 2025-04-23 RX ADMIN — AMPICILLIN SODIUM AND SULBACTAM SODIUM 3 G: 2; 1 INJECTION, POWDER, FOR SOLUTION INTRAMUSCULAR; INTRAVENOUS at 06:00

## 2025-04-23 RX ADMIN — POLYETHYLENE GLYCOL 3350 17 G: 17 POWDER, FOR SOLUTION ORAL at 09:42

## 2025-04-23 RX ADMIN — HEPARIN SODIUM 5000 UNITS: 5000 INJECTION, SOLUTION INTRAVENOUS; SUBCUTANEOUS at 10:38

## 2025-04-23 RX ADMIN — AMPICILLIN SODIUM AND SULBACTAM SODIUM 3 G: 2; 1 INJECTION, POWDER, FOR SOLUTION INTRAMUSCULAR; INTRAVENOUS at 00:18

## 2025-04-23 RX ADMIN — HYDROGEN PEROXIDE 1 APPLICATION: 30 SOLUTION TOPICAL at 20:59

## 2025-04-23 RX ADMIN — ACETAMINOPHEN 975 MG: 325 TABLET, FILM COATED ORAL at 06:00

## 2025-04-23 RX ADMIN — ACETAMINOPHEN 975 MG: 325 TABLET, FILM COATED ORAL at 21:53

## 2025-04-23 RX ADMIN — HEPARIN SODIUM 5000 UNITS: 5000 INJECTION, SOLUTION INTRAVENOUS; SUBCUTANEOUS at 18:55

## 2025-04-23 RX ADMIN — AMPICILLIN SODIUM AND SULBACTAM SODIUM 3 G: 2; 1 INJECTION, POWDER, FOR SOLUTION INTRAMUSCULAR; INTRAVENOUS at 11:43

## 2025-04-23 RX ADMIN — SERTRALINE 50 MG: 50 TABLET, FILM COATED ORAL at 21:58

## 2025-04-23 ASSESSMENT — COGNITIVE AND FUNCTIONAL STATUS - GENERAL
PERSONAL GROOMING: A LITTLE
HELP NEEDED FOR BATHING: A LITTLE
TOILETING: A LITTLE
DRESSING REGULAR UPPER BODY CLOTHING: A LITTLE
DRESSING REGULAR LOWER BODY CLOTHING: A LITTLE
EATING MEALS: A LITTLE
DAILY ACTIVITIY SCORE: 18

## 2025-04-23 ASSESSMENT — PAIN - FUNCTIONAL ASSESSMENT
PAIN_FUNCTIONAL_ASSESSMENT: 0-10

## 2025-04-23 ASSESSMENT — ACTIVITIES OF DAILY LIVING (ADL)
BATHING_ASSISTANCE: MINIMAL
ADL_ASSISTANCE: INDEPENDENT

## 2025-04-23 ASSESSMENT — PAIN DESCRIPTION - LOCATION
LOCATION: FACE
LOCATION: FACE

## 2025-04-23 ASSESSMENT — PAIN SCALES - GENERAL
PAINLEVEL_OUTOF10: 0 - NO PAIN
PAINLEVEL_OUTOF10: 0 - NO PAIN
PAINLEVEL_OUTOF10: 3
PAINLEVEL_OUTOF10: 5 - MODERATE PAIN
PAINLEVEL_OUTOF10: 7

## 2025-04-23 NOTE — SIGNIFICANT EVENT
Rapid Response Respiratory Therapy Note: RADAR    Start Time: 0407  End Time: 0425  Location: 02    Initial Vital Signs and O2: HR: 70    RR: 7     Type of O2: 2L NC    SpO2: 96%  Breath Sounds:      Respiratory Concerns:  []  None []  Increased WOB []  Shallow respirations []  Irregular Respirations   []  Tachypnea [x]  Bradypnea []  Oxygen desaturation []  Cyanosis   []  Poor Secretion Clearance []  Impaired/Weak Cough []  Copious Secretions []  Thick Secretions   []  Inability to Protect Airway []  Apnea []  Respiratory Arrest []  Shortness of Breath   [] Hemodynamic Instability []  Asymmetric Chest Rise []  Adventitious Breath Sounds []  Abnormal CXR   []  Aspiration []  Other:       Interventions:  [x]  None []  ABG/VBG  []  Assist w/ICU transfer []  Bag-mask ventilation   []  Bronchial Hygiene []  Trach care/Suction []  ETCO2 []  CPR   []  Assist Intubation []  Venti Mask/NRB []  Chest x-ray []  CT/MRI transport    []  High Flow Therapy []  Igel  []  Nasal Airway []  NT Suctioning   []  Nebulizer treatment []  NIPPV (CPAP/BiPAP) []  Oxygen  Type:  FiO2:  Liter Flow:  SpO2:  []  Oral Airway   []  Oral Suctioning [x]  Other: Pt with sleep apnea - ENT to follow up with pt     Outcome:  [x]  Maintain on Division []  Transferred to ICU []  Transferred to ED [x]  Bedside nurse instructed to page Rapid Response for any concerns or acute change in condition/VS     Final Vital Signs and O2: HR:    RR:    Type of O2:     SpO2:    Breath Sounds:     Additional Comments:

## 2025-04-23 NOTE — PROGRESS NOTES
Occupational Therapy    Evaluation/Treatment    Patient Name: Bailey Zamora  MRN: [id number]  Department: [facility]  Room: [address] [place] [address]  Today's Date: 04/23/25  Time Calculation  Start Time: 0840  Stop Time: 0915  Time Calculation (min): 35 min       Assessment:  End of Session Communication: Bedside nurse  End of Session Patient Position: Bed, 2 rail up, Alarm off, not on at start of session  OT Assessment Results: Decreased ADL status, Decreased upper extremity strength, Decreased endurance, Decreased functional mobility, Decreased IADLs    Plan:  Treatment Interventions: ADL retraining, Functional transfer training, Endurance training, Patient/family training, Equipment evaluation/education, Compensatory technique education  OT Frequency: 2 times per week  OT Discharge Recommendations: Low intensity level of continued care  OT Recommended Transfer Status: Assist of 1  OT - OK to Discharge: Yes    Subjective   Current Problem:  1. Facial paralysis        2. Disorder of seventh cranial nerve  Surgical Pathology Exam    Surgical Pathology Exam      3. Acquired deformity of head  Surgical Pathology Exam    Surgical Pathology Exam      4. 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    Precautions:  Precautions Comment: 3x JAIRO drain (2x RLE, 1x L neck)     Date/Time Vitals Session Patient Position Pulse Resp SpO2 BP MAP (mmHg)    04/23/25 1020 --  --  88  10  99 %  98/62  --     04/23/25 1136 --  --  89  --  99 %  102/65  --           Pain:  Pain  Assessment  Pain Assessment: 0-10  0-10 (Numeric) Pain Score: 3  Pain Location: Neck    Objective   Cognition:  Overall Cognitive Status: Within Functional Limits  Arousal/Alertness: Appropriate responses to stimuli  Orientation Level: Oriented X4  Following Commands: Follows all commands and directions without difficulty  Safety Judgment: Good awareness of safety precautions    Home Living:  Type of Home: House  Lives With: Spouse (+3 kids (teenagers), 2 dogs, 2 cats)  Home Adaptive Equipment: None  Home Layout: Two level, Bed/bath upstairs  Home Access: Stairs to enter without rails  Entrance Stairs-Number of Steps: 1  Bathroom Shower/Tub: Walk-in shower  Bathroom Toilet: Standard  Bathroom Equipment: Shower chair with back    Prior Function:  ADL Assistance: Independent  Homemaking Assistance: Independent  Ambulatory Assistance: Independent  Hand Dominance: Right  Prior Function Comments: Denies fall hx    IADL History:  Current License: Yes  Mode of Transportation: Car  Occupation: Full time employment  Type of Occupation: Middle school, special edu teacher  Leisure and Hobbies: Enjoys spending time with faily, walking, being outside    ADL:  Eating Assistance: Stand by (anticipate)  Grooming Assistance: Minimal (assist for facial grooming)  Bathing Assistance: Minimal (anticipate)  UE Dressing Assistance: Minimal (anticipate)  LE Dressing Assistance: Minimal (assist to don R sock)  Toileting Assistance with Device: Minimal (anticipate)    Bed Mobility/Transfers:   Bed Mobility 1  Bed Mobility 1: Supine to sitting  Level of Assistance 1: Contact guard  Bed Mobility Comments 1: VCs for body mechanics    Bed Mobility 2  Bed Mobility  2: Scooting  Level of Assistance 2: Contact guard  Bed Mobility Comments 2: pt use of BUE support on bed    Transfer 1  Technique 1: Sit to stand, Stand to sit  Transfer Device 1:  (HHA)  Transfer Level of Assistance 1: Minimum assistance  Trials/Comments 1: VCs for body mechanics and  upright posture    Transfers 2  Transfer From 2: Bed to  Transfer to 2: Chair with arms  Technique 2: Stand pivot  Transfer Device 2:  (HHA)  Transfer Level of Assistance 2: Minimum assistance  Trials/Comments 2: VCs for body mechanics/safety    Therapy/Activity:   Therapeutic Activity  Therapeutic Activity 1: Increased time delegated to assist (MOD A) pt with brush/braid hair while seated in chair    Therapeutic Activity 2: OT facilitates education re: OT role in psychosocial health intervention to max indep, safety, and engagement in I/ADL tasks; pt with increased interest in additional resources. OT provides pt with interactive handouts including mindful breathing, positive affirmations, journaling, guided imagery, and positive coping skills. Pt highly appreciative and receptive of OT this date.    Vision:  Vision - Basic Assessment  Current Vision: Wears glasses all the time  Patient Visual Report:  (Denies acute visual deficits)    Sensation:  Sensation Comment: Denies N/T; no apparent deficits    Strength:  Strength Comments: BUE grossly WFL    Hand Function:  Hand Function  Gross Grasp: Functional  Coordination: Functional    Outcome Measures:   [facility] Daily Activity  Putting on and taking off regular lower body clothing: A little  Bathing (including washing, rinsing, drying): A little  Putting on and taking off regular upper body clothing: A little  Toileting, which includes using toilet, bedpan or urinal: A little  Taking care of personal grooming such as brushing teeth: A little  Eating Meals: A little  Daily Activity - Total Score: 18    Education Documentation  Handouts, taught by Leena Gibbs OT at 4/23/2025 11:49 AM.  Learner: Patient  Readiness: Acceptance  Method: Explanation, Demonstration  Response: Verbalizes Understanding    Body Mechanics, taught by Leena Gibbs OT at 4/23/2025 11:49 AM.  Learner: Patient  Readiness: Acceptance  Method: Explanation, Demonstration  Response: Verbalizes  Understanding    Precautions, taught by Leena Gibbs OT at 4/23/2025 11:49 AM.  Learner: Patient  Readiness: Acceptance  Method: Explanation, Demonstration  Response: Verbalizes Understanding    ADL Training, taught by Leena Gibbs OT at 4/23/2025 11:49 AM.  Learner: Patient  Readiness: Acceptance  Method: Explanation, Demonstration  Response: Verbalizes Understanding    Education Comments  No comments found.      Goals:  Encounter Problems       Encounter Problems (Active)       ADLs       Pt will complete UB /LB bathing tasks with modified independence while seated and AE as needed. (Progressing)       Start:  04/23/25    Expected End:  05/14/25            Pt will complete LB dressing with modified independence while seated and/or standing and AE as needed.  (Progressing)       Start:  04/23/25    Expected End:  05/14/25            Pt will complete toilet hygiene while seated /standing with independent level of assistance.   (Progressing)       Start:  04/23/25    Expected End:  05/14/25               BALANCE       Pt will demo improved dynamic sitting /standing balance while engaging in I/ADL task with independent level of assistance and no LOB.  (Progressing)       Start:  04/23/25    Expected End:  05/14/25               MOBILITY       Pt will complete functional ambulation household /community distance with modified independence and LRAD. (Progressing)       Start:  04/23/25    Expected End:  05/14/25               TRANSFERS       Pt will complete functional transfers with modified independence and LRAD.  (Progressing)       Start:  04/23/25    Expected End:  05/14/25                   ---------------  Leena Gibbs (OTR/L, OTD)  Inpatient Occupational Therapist   Rehab Office: [phone]

## 2025-04-23 NOTE — SIGNIFICANT EVENT
"ENT Flap Check  Bailey Zamora is a 49 y.o. 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    Flap check:  Patient is doing well since coming up from PACU.  No acute concerns.  Pain is well-controlled.    Physical Examination  Vitals: BP 99/63   Pulse 74   Temp 36.6 °C (97.9 °F)   Resp 16   Ht 1.727 m (5' 8\")   Wt 76.3 kg (168 lb 3.4 oz)   SpO2 97%   BMI 25.58 kg/m²   GENERAL: Appears well developed, well nourished, flap stable  RESPIRATION: Breathing comfortably on supplemental O2 via NC, no stridor  EYES: EOM Intact  NEURO: Awake and alert  HEAD AND FACE: Skin with no masses or lesions, incisions clean dry and intact without evidence of fluid collection or significant swelling  Flap: No skin paddle for monitoring, Doppler signal strong, incisions all clean dry and intact  EARS: Normal external ears  NOSE: External nose midline  ORAL CAVITY/OROPHARYNX/LIPS: Normal mucous membranes, normal floor of mouth/tongue/OP, no masses or lesions are noted  NECK/LYMPH: Small submental incision clean dry and intact  EXTR: Gracilis donor site covered with Telfa island dressing  DRAINS: All drains with appropriate output    Assessment:  Bailey Zamora is a 49 y.o. female who on 4/22/2025 underwent left facial reanimation with chrysalis free flap from right, nerve anastomosis to cross face CN VII transfer, Endo side with nerve to masseter, mass excised from under chin. Flap check stable.    Plan:  -Flap Checks: q 6 hours by resident, every 1 hr by nursing  -Drains: Monitor output  -Analgesia: PCA  -FEN: Regular diet  -Pulm: Wean O2 as tolerated  -ID: Unasyn for 24 hours then Augmentin      "

## 2025-04-23 NOTE — OP NOTE
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]     * Facial weakness [R29.810]    Procedures  GRACILIS FREE FLAP WITH FACIAL SUSPENSION    MN PREP SITE F/S/N/H/F/G/M/D GT 1ST 100 SQ CM/1PCT [57438]  MN IONM 1 ON 1 IN OR W/ATTENDANCE EACH 15 MINUTES [55911]  MN MICROSURG TQS REQ USE OPERATING MICROSCOPE [75802]  MN REDUCTION MASSETER MUSCLE & BONE EXTRAORAL [78970]  MN EXCISION EXCESSIVE SKIN & SUBQ TISSUE OTHER AREA [90736]  MN NEUROPLASTY &/TRANSPOSITION CRANIAL NERVE [59344]  MN EXC PRTD AVERY/PRTD GLND LAT DSJ&PRSRV FACIAL NR [85350]  MN TRANSECTION/AVULSION FACIAL NRV DIFFERENT/CMPL [39339]  MN TRANSECTION/AVULSION MASSETERIC NRV DIFFERENT/CMPL [95260]  MN RESCJ/EXC LES INFRATEMPOR FOSSA SPACE APEX XDRL [05113]  MN SUTURE NERVE INFRATEMPORAL W/WO GRAFT [67600]  MN ANASTOMOSIS NERVE OBTURATOR/CFNG [70573]  MN NERVE PEDICLE TRANSFER SECOND STAGE [00612]  MN EXPLORATION N/FLWD SURG NECK ARTERY [54123]  MN NERVE REPAIR W/AUTOGENOUS VEIN GRAFT EA NERVE [43615] X 2  MN GRF FACIAL NRV PALYSS FR MUSCLE FLAP MICROSURG [95187] - 22 modifier  MN FLAP NEUROVASCULAR PEDICLE [01281]  MN GRAFT FACIAL NERVE PARALYSIS FREE FASCIAL GRAFT [85572]  MN ANASTOMOSIS FACIAL-SPINAL ACCESSORY [32024]  MN ADJNT TIS TRNSFR/REARGMT ANY AREA 30.1-60 SQ CM [48265]  MN ADJNT TIS TRNSFR/REARGMT ANY AREA ADDITIONAL [64046]  MN EXPLORATION N/FLWD SURG NECK ARTERY [92732]  MN MIDFACE FLAP W/ VASCULAR PEDICLE [88787]  MN FACIAL SUSPENSION [09772]  MN NECK EXPLORATION FOR RECURRENT  INFECTION [78402]  [place] EXCISION SECONDARY NEUROMA FROM PREVIOUS CFNG [26011]    Surgeons      * Wilfred Muñoz - Primary     * Rubén Oh MD - Co-surgeon    Resident/Fellow/Other Assistant:  Surgeons and Role:     * Elia Colon MD - Resident - Assisting     * Phillip Ac MD - Resident - Assisting      Anesthesia: General   Anesthesia Staff: Anesthesiologist: Semaj Graf MD  CRNA: Troy Antoine, APRN-CRNA; Avila Deleon V, APRN-CRNA; Oscar Mcbride, APRN-CRNA  SRNA: Baylee Eric RN; Petar Garcia  Frontline Breaker: ERICK Rodríguez  Estimated Blood Loss: 100 mL  Specimen:   ID Type Source Tests Collected by Time   1 : LEFT NECK MASS Tissue SOFT TISSUE MASS RESECTION SURGICAL PATHOLOGY EXAM Wilfred Muñoz MD 4/22/2025 1511      Drains and/or Catheters:   Closed/Suction Drain 1 Anterior;Right Thigh Bulb 10 Fr. (Active)   Site Description Healing 04/22/25 1920   Dressing Status Other (Comment) 04/22/25 1920   Drainage Appearance Bloody 04/22/25 1745   Status To bulb suction 04/22/25 1920   Output (mL) 7.5 mL 04/23/25 0605       Closed/Suction Drain 2 Anterior;Right Thigh Bulb 10 Fr. (Active)   Site Description Healing 04/22/25 1920   Dressing Status Other (Comment) 04/22/25 1920   Drainage Appearance Bloody 04/22/25 1745   Status To bulb suction 04/22/25 1920   Output (mL) 10 mL 04/23/25 0605       Closed/Suction Drain 3 Anterior Neck Bulb 10 Fr. (Active)   Site Description Healing 04/22/25 1920   Dressing Status Other (Comment) 04/22/25 1920   Drainage Appearance Bloody 04/22/25 1745   Status To bulb suction 04/22/25 1920   Output (mL) 10 mL 04/23/25 0605       [REMOVED] Urethral Catheter Non-latex 14 Fr. (Removed)   Site Assessment Clean;Skin intact 04/22/25 1920   Collection Container Standard drainage bag 04/22/25 1920   Securement Method Securing device (Describe) 04/22/25 1920   Reason for Continuing Urinary Catheterization accurate hourly measurement of urine volume in a  critically ill patient that cannot be assessed by other volumes and urine collection strategies 04/22/25 1920   Output (mL) 275 mL 04/23/25 0605     Implants:  Implants       Type Name Action Serial No.      Implant DEVICE, LUISITOSOTMOTIC 3.0MM - [id number] Implanted               Findings:     Gracilis with 2 vectors  Obturator to Masseter nerve end-to-side and Obturator to CFNG end-to-end    Indications: Bailey Zamora is an 49 y.o. female who presents with a history of left facial weakness. They previously underwent stage 1 of CFNG (5+7) using the sural nerve and present today for gracilis free flap for facial reanimation along the associated procedures. Therefore, the aforementioned procedures were indicated to address the sequale of facial paralysis including eye irritation, incomplete eye closure, facial asymmetry, nasal airway obstruction, oral incompetence, dysphagia, facial droop, inability to smile/asymmetric smile, drooling, cheek biting, and air escape with plosive sounds.    The patient was seen in the preoperative area. The risks, benefits, complications, treatment options, non-operative alternatives, expected recovery and outcomes were discussed with the patient. The possibilities of reaction to medication, pulmonary aspiration, injury to surrounding structures, bleeding, recurrent infection, the need for additional procedures, failure to diagnose a condition, and creating a complication requiring transfusion or operation were discussed with the patient. The patient concurred with the proposed plan, giving informed consent.  The site of surgery was properly noted/marked if necessary per policy. The patient has been actively warmed in preoperative area. Preoperative antibiotics have been ordered and given within 1 hours of incision. Venous thrombosis prophylaxis have been ordered including bilateral sequential compression devices    Procedure Details:      Dr. Oh and Dr. Muñoz performed the  follow procedure as co-surgeons for the majority of the case. Please see his operative note for further details of his portion of the procedure.    The patient was previously brought to the operating room and induced by the anesthesia team. They were prepped and draped in the usual sterile fashion. Electrodes were placed on the facial for facial nerve monitoring.     Preoperative markings and measurements on the face demonstrated the need for a 2 vector gracilis free flap, with the superior vector coursing from the oral commissure and lateral upper lip to the inferior aspect of the molar eminence, measuring 4 cm. The inferior vector from the oral commissure and lateral lower lip to the inferior-posterior zygomatic arch and immediate preauricular area measured 9 cm. The vector plan was based upon the nature of the smile on the fully functioning side.     We began with harvest of a right gracilis free flap.  The adductor tendon was palpated at its insertion to the pubic ramus with the leg abducted and slightly flexed at the knee.  The underlying muscle was palpated along its inferior course to attachments at the tibial condyle. An incision was made two finger-breadths below the insertion of the tendon extending 10 cm inferiorly with a 15-blade scalpel.  The subcutaneous tissue and fat was transected with electrocautery. The fascia overlying the adductor muscular septum was sharply incised to expose the muscle bellies. The gracilis muscle was identified medial to the adductor longus.  The investing fascia of the gracilis muscle was preserved overlying the muscle in order to provide a gliding plane for facial reanimation. Blunt dissection was used to identify the vascular pedicle entering the muscle on its lateral surface, as well as the obturator nerve which entered just superior to the pedicle. The obturator nerve was mobilized and dissected in a superior fashion to expose 8 cm of length.  The vascular pedicle was  then dissected free of surrounding soft tissue along its course deep to the adductor longus and superficial to the jarred. Perforating branches to the adductor muscles were identified, clipped with hemoclips, and divided.  The lateral border of the adductor longus was bluntly dissected and the vascular pedicle identified running underneath it.  This was dissected circumferentially back to its origin at the profunda system. Two veins and one artery were preserved the entire length of the pedicle and with sufficient length for microvascular anastomosis. The gracilis muscle was dissected free of surrounding soft tissue attachments circumferentially both above the insertion of the pedicle up to the muscle tendinous insertion on the pubic ramus as well as below the pedicle for 10 cm of length. The obturator nerve was then stimulated with a nerve stimulator and confirmed brisk contraction of the gracilis muscle. A multi-vector flap design was then created by dividing the gracilis muscle into two muscle bellies each of the desired length for optimal inset in the face.  The vascular pedicle and obturator nerve were protected during dissection and intra-muscular perforators were identified and preserved.  Direct stimulation of the obturator nerve also confirmed both muscle bellies had excellent contraction. Two 10 Fr flat JAIRO drain were placed. In order to facilitate tension free closure, the adjacent skin flaps were sharply elevated and raised for a total of 36 square centimeters. The skin flaps were rotated and advanced to close the donor site defect in a multi-layer fashion with 3-0 Vicryl sutures, and the skin was closed with a running 5-0 Monocryl suture.      Attention was then turned to the left face. A preauricular excision extending into the hairline superiorly and down into the neck similar to modified Roman incision was made. There was previous scar here from previous selective neuromyectomy which was sharply  excised. This was incised with a #15 blade. Inferiorly from approximately the mandible down, this was elevated in a fairly superficial subcutaneous plane.  The previously banked CFNG in the pre-auricular sulcus was identified and dissected free from the surrounding fascial attachments. This CFNG was transposed inferiorly for second stage transfer once the gracilis was inset with coaptation to the obturator nerve. In the face, in order to maintain a good amount of fat on the skin flap for a gliding plane for the gracilis, we quickly transitioned to a sub-SMAS plane approximately 2 cm in front of the ear. This sub-SMAS plane was just above the plane of the facial nerve anteriorly to make a tract for the gracilis. The dissection proceeded through the superficial parotid gland where the buccal branch of the facial nerve was identified. This was dissected in a retrograde fashion and the zygomatic and frontal branches were identified. The buccal branch was released from the surrounding fascia and transposed inferiorly. The remainder of the superficial parotid gland was removed in this area. A tunnel was made to the oral commissure and inferiorly and superiorly to it. Of note, over the zygomatic arch, we elevated in a more superficial plane to avoid injuring and to be above the zygomaticus muscles. Following this, there was good exposure for the gracilis. Due to the incomplete paralysis and partially functional facial nerve branches, there was increased complexity with the dissection and inset of the gracilis free flap. This required meticulous dissection and increased time of dissection more so than routinely encountered for this portion of the procedure.     Next. we performed our masseter nerve exploration. The temporomandibular joint and sigmoid notch were palpated. Approximately 1 cm posterior to the sigmoid notch, the masseter nerve exploration was undertaken. Using a small S (Little) retractor, we serially  elevated and feathered through the masseter muscle until the master nerve was identified. At this time, we marked the nerve with a vessel loop. Dissection was carried superiorly into the infratemporal space for length on the masseteric nerve for transposition. This was traced out and ligated distally for later microvascular anastomosis. The masseter nerve was transected and transposed for easier access for later anastomosis. In order to improve the exposure of the nerve and prepare the wound bed for the gracilis, the anterior masseter muscle was resected. Additionally, the buccal fat was identified and carefully teased and resected.      Next, we explored the neck near the body of the mandible at the facial notch to find the facial vessels. The facial artery and vein were identified. They were cleaned and ligated with Acland clamps. There was strong flow to the facial artery.     Incidentally the patient was noted to have recurrent infections at the left submentum at previous nerve reconstruction site. This prompted exploration at the time of surgery. At the left neck, an incision was made with dissection carried down to the submental space. Granulation and broken-down xenograft matrix from previous nerve reconstruction was encountered. This was suspected to be the source of recurrent swelling and infection in this area. Generous excision of this granulation and material was performed to debride the area. Following this, a neuroma suspected from a portion of the previous cross face nerve graft was excised in the surrounding granulation. This was cleansed with sterile saline. The surrounding neuroma and inflammatory soft tissue was sent for permanent pathology.    At the left neck, meticulous nerve dissection was performed with identification of distal facial nerve branches. A marginal mandibular nerve branch stimulated contributing to downward and lateral pull of the oral commissure in unfavorable fashion was marked  for neurectomy. Small clips were applied proximally and distally and this nerve segment was lysed and resected in neurectomy fashion.     Next, having all of our nerves and vessels prepared, we brought the gracilis up to the face. In order to improve the security of the stitches to the tip of the gracilis flap, we ran a 3-0 Vicryl suture from end to end to crate a pseudo-tendon-like anchor point for the inset stitches. The superior aspect of the muscle in the leg became the region that would be inset into the oral commissure. The flap was oriented so that the pedicle and nerve would come off inferiorly. This orientation was accomplished by functionally rotating the flap 180 degrees in both X and Y axis relative to orientation in the leg. At this time, PDS sutures were used to inset the flap into the oral commissure.  They were first inset into the orbicularis oris and deep tissue around the oral commissure to ensure that good stitches were secured and that the correct amount of pull and vector were what we were looking for.     One stitch was inset at the oral commissure, 2 were inset above the oral commissure, and 1 was inset just below the oral commissure to give a correct pull of the lip. These were all secured with hemostats and numbered appropriately. These stitches were placed based upon the excursion of the patient's good size. Following this, the PDS sutures that had been placed in the oral commissure were then placed with appropriate spacing into the gracilis muscle. In order to minimize punching in this region, we placed these stitches equally far apart, and the tissues were inset into the muscle itself in a horizontal mattress fashion. These were then tied down tight to inset the flap.      Following this, we performed the microvascular anastomosis. The flap and the facial vessels were prepped and cleaned in a standard fashion. The microscope was brought in for microsurgical technique. We then performed a  microvascular anastomosis between the flap artery and facial artery using nylon sutures. Following this, we then performed a microvascular anastomosis between the facial vein and flap vein using a microvascular coupling device. Following this, the clamps were released. There was shown to be good flow through the flap. A Yuyuto implantable doppler was placed over the arterial anastomosis.      Following this, we then turned our attention to the neural anastomosis. The masseteric branch of the trigeminal nerve was anastomosed to the obturator nerve in end-to-side fashion using 9-0 nylon sutures. Two sutures were placed for this purpose. Following suture placement, the nerve fibers themselves were trimmed so they approximated each other well. A previously harvested vein graft was utilized as a conduit wrap for the coaptation.    Additionally, the CFNG was anastamosed to the obturator nerve in an end-to-end fashion. Upon performing neural anastomoses. Following this, the vein graft was wrapped around the coaptation as a conduit.     We then continued our inset of the gracilis flap. The superior vector from the oral commissure and upper lip to the malar eminence was previously marked out to be 4 cm. This was then inset into the inferior aspect of the malar eminence using PDS sutures. Regarding the inferior vector, this was measured to be 10 cm preoperatively, and we also confirmed this by looking at the Vicryl stitches that were placed on the muscle in situ to verify that there was correct tension on this. This was measured out initially to be 10 cm and was confirmed so after inset. This was inset into the preauricular region over the area of the joint. Next, the midface SMAS flap was suspended in a posterior-superior fashion for facial suspension. Following this, hemostasis was obtained. Wound was irrigated. In order to close the face and neck, the inferior skin flaps were sharply elevated and raised for a total of  30 square centimeters. The skin flaps were advanced and rotated and closed in multi- layered fashion. The 10 Fr drain was placed immediately prior to closure.      The patient tolerated the procedure well. There were no complications. All instrument and sponge counts following the procedure were correct.       The patient was then returned to anesthesia, who transported him to the PACU in excellent condition      Complications:  None; patient tolerated the procedure well.    Disposition: PACU - hemodynamically stable.  Condition: stable     Attending Attestation:   I was present for and performed all critical portions of the procedure.     Wilfred Muñoz MD      , Facial Plastic & Reconstructive Surgery        P: [id number])  F: [phone]

## 2025-04-23 NOTE — PROGRESS NOTES
Physical Therapy                 Therapy Communication Note    Patient Name: Bailey Zamora  MRN: [id number]  Department: [facility]  Room: [address] [place] [address]  Today's Date: 4/23/2025     Discipline: Physical Therapy    PT Missed Visit: Yes     Missed Visit Reason: Missed Visit Reason:  (pt madina declined 2/2 recently got back into bed/wanting to rest.)    Missed Time: Attempt 10:25    Comment:

## 2025-04-23 NOTE — OP NOTE
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.2]     * Facial weakness [R29.810]    Procedures  [place], preparation and transfer of gracilis free muscle flap with microvascular anastomosis - right (end-to-end arterial anastomosis to left facial artery, end-to-end venous anastomosis to the left facial vein (CPT 55477)  Formation of neurovascular pedicle flap graft - second multivector gracilis neurovascular pedicled flap (CPT 21503)  Excision of parotid gland with dissection and preservation of facial nerve - left (CPT 17580)  Use of operating microscope for microvascular anastomosis (CPT 74429)  Use of intraoperative nerve monitoring (CPT 45823)  Surgical preparation of wound bed (CPT 98019)  Exploration of neck vessels - left facial artery and facial vein (CPT 96415)  Adjacent tissue transfer/rearrangement of any area 30.1-60 SQ CM - right lower extremity for closure of secondary defect measuring a total of 36 square centimeters (CPT 08548)  Adjacent tissue transfer/rearrangement of any area 30.1-60 SQ CM - left neck for closure of secondary defect measuring a total of 35 square centimeters (CPT 15658)  Midface flap with preservation of vascular pedicles - left SMAS flap (CPT 76975)  Facial Suspension - left (CPT 02749)  Approach to infratemporal fossa - left (CPT 03182)  Reduction of masseter muscle - left (CPT 35213)  Excision of skin or subcutaneous tissue - right buccal fat (CPT 96568)  Transposition of cranial nerve - left masseteric nerve (CPT 27682)  Transection of cranial nerve, extradural - left masseteric nerve (CPT  07462)  Anastomosis of masseter-obturator nerves to restore function of the face - left (CPT 01523)  Nerve pedicle transfer 2st stage with cross-face nerve graft - (CPT 22287)  Nerve repair with autogenous vein graft - left CFNG nerve to obturator nerve (CPT 50323)  Nerve repair with autogenous vein graft - left masseter nerve to obturator nerve (CPT 63173)    Surgeons      * Rubén Oh - Primary     * Wilfred Muñoz - Primary    Resident/Fellow/Other Assistant:  Surgeons and Role:     * Elia Colon MD - Resident - Assisting     * Phillip Ac MD - Resident - Assisting    Anesthesia: General   Anesthesia Staff: Anesthesiologist: Semaj Graf MD  CRNA: Troy Antoine APRN-CRNA; Avila Deleon V APRN-CRNA; Oscar Mcbride APRN-CRNA  SRNA: Baylee Eric RN; Petar Garcia  Frontline Breaker: ERICK Rodríguez  Estimated Blood Loss: 30 mL  Specimen:   ID Type Source Tests Collected by Time   1 : LEFT NECK MASS Tissue SOFT TISSUE MASS RESECTION SURGICAL PATHOLOGY EXAM Wilfred Muñoz MD 4/22/2025 1511      Drains and/or Catheters:   Closed/Suction Drain 1 Anterior;Right Thigh Bulb 10 Fr. (Active)   Site Description Healing 04/22/25 1920   Dressing Status Other (Comment) 04/22/25 1920   Drainage Appearance Bloody 04/22/25 1745   Status To bulb suction 04/22/25 1920   Output (mL) 7.5 mL 04/23/25 0605       Closed/Suction Drain 2 Anterior;Right Thigh Bulb 10 Fr. (Active)   Site Description Healing 04/22/25 1920   Dressing Status Other (Comment) 04/22/25 1920   Drainage Appearance Bloody 04/22/25 1745   Status To bulb suction 04/22/25 1920   Output (mL) 10 mL 04/23/25 0605       Closed/Suction Drain 3 Anterior Neck Bulb 10 Fr. (Active)   Site Description Healing 04/22/25 1920   Dressing Status Other (Comment) 04/22/25 1920   Drainage Appearance Bloody 04/22/25 1745   Status To bulb suction 04/22/25 1920   Output (mL) 10 mL 04/23/25 0605       [REMOVED] Urethral Catheter Non-latex 14 Fr. (Removed)    Site Assessment Clean;Skin intact 04/22/25 1920   Collection Container Standard drainage bag 04/22/25 1920   Securement Method Securing device (Describe) 04/22/25 1920   Reason for Continuing Urinary Catheterization accurate hourly measurement of urine volume in a critically ill patient that cannot be assessed by other volumes and urine collection strategies 04/22/25 1920   Output (mL) 275 mL 04/23/25 0605     Implants:  Implants       Type Name Action Serial No.      Implant DEVICE, ANASOTMOTIC 3.0MM - [id number] Implanted               Findings:     Gracilis with 2 vectors, inferior vector 10 cm, superior vector 4 cm  Obturator to Masseter nerve end-to-side and Obturator to CFNG end-to-end     Indications: Bailey Zamora is an 49 y.o. female who presents with a history of left facial weakness. They previously underwent stage 1 of CFNG using the sural nerve and present today for gracilis free flap for facial reanimation along the associated procedures. Therefore, the aforementioned procedures were indicated to address the sequale of facial paralysis including eye irritation, incomplete eye closure, facial asymmetry, nasal airway obstruction, oral incompetence, dysphagia, facial droop, inability to smile/asymmetric smile, drooling, cheek biting, and air escape with plosive sounds.    The patient was seen in the preoperative area. The risks, benefits, complications, treatment options, non-operative alternatives, expected recovery and outcomes were discussed with the patient. The possibilities of reaction to medication, pulmonary aspiration, injury to surrounding structures, bleeding, recurrent infection, the need for additional procedures, failure to diagnose a condition, and creating a complication requiring transfusion or operation were discussed with the patient. The patient concurred with the proposed plan, giving informed consent.  The site of surgery was properly noted/marked if necessary per policy. The  patient has been actively warmed in preoperative area. Preoperative antibiotics have been ordered and given within 1 hours of incision. Venous thrombosis prophylaxis have been ordered including bilateral sequential compression devices    Procedure Details:    The patient was previously brought to the operating room and induced by the anesthesia team. They were prepped and draped in the usual sterile fashion. Electrodes were placed on the facial for facial nerve monitoring.      Preoperative markings and measurements on the face demonstrated the need for a 2 vector gracilis free flap. The inferior vector from the oral commissure and lateral lower lip to the inferior-posterior zygomatic arch and immediate preauricular area measured 10 cm and the superior vector 4 cm. The vector plan was based upon the nature of the smile on the fully functioning side.     Attention was first turned to the right medial thigh. A 10 cm incision centered 9 cm below the origin of the adductor tendon was drawn out. This incision was placed 1 cm posterior to the adductor tendon, which was palpable. The incision was made down through the fat. [place] through the fat and fascia, there was a transversely oriented branch of the saphenous vein which was ligated with clips and harvested for later use as vein grafts. Of note, this vein typically marks the position of the pedicle to the gracilis flap. The fat and fascia were then divided further until the adductor muscle was identified. The fascia of the adductor was then incised and swept posteriorly into the septum between the gracilis and adductor. At this time, the pedicle was identified coursing under the adductor muscle and just superior to this taking a more superior course was identified the obturator nerve. Following this, we then divided the fat and fascia over the gracilis muscle as we moved posteriorly being sure to keep a significant amount of myomesia and then fat on the gracilis  muscle to serve as gliding plane. This was then taken superiorly and inferiorly. Superiorly, the gracilis tendon was identified and skeletonized. At this time, while in the native position, the position of the pedicle and 2 cm on either side (for a total of 6 cm on either side) was marked with Vicryl sutures on the anterior surface of the muscle. Following this, we then freed up the gracilis posteriorly and deep. Superiorly, the tendon was divided, 4 cm above the pedicle. Inferiorly, the muscle was divided 6 cm below the pedicle. Following this, we then freed up the gracilis muscle on the deep surface, again maintaining myomesia and fascia on the muscle for a gliding plane. We then used a 1 inch Penrose drain placed through the adductor muscle via a Katy clamp to retract the adductor muscle anteriorly and laterally to expose the pedicle. The pedicle was then dissected for later microvascular anastomosis. Of note, there was a significant anterior muscular branch coming off the pedicle going into abductor, just at the medial aspect of the adductor as is usually the case. Following this, we then dissected the pedicle up further towards the profunda system which was identified taking all small perforators and branches using small clips. Following this, we then dissected the nerve up superiorly as high as we could go and got approximately 10 cm of nerve length. The nerve was then cut and kept with the flap. Following this, we then divided the pedicle and set aside the flap for later reconstruction. In the leg. Two 10 Fr flat JAIRO drain were placed. In order to facilitate tension free closure, the adjacent skin flaps were sharply elevated and raised for a total of 36 square centimeters. The skin flaps were rotated and advanced to close the donor site defect in a multi-layer fashion with 3-0 Vicryl sutures, and the skin was closed with a running 4-0 plain gut suture.      Attention was then turned to the left face. A  preauricular excision extending into the hairline superiorly and down into the neck similar to modified Roman incision was made. This was incised with a #15 blade. Inferiorly from approximately the mandible down, this was elevated in a fairly superficial subcutaneous plane. The previously banked CFNG in the pre-auricular sulcus was identified and dissected free from the surrounding fascial attachments. In the face, in order to maintain a good amount of fat on the skin flap for a gliding plane for the gracilis, we quickly transitioned to a sub-SMAS plane approximately 2 cm in front of the ear. This sub-SMAS plane was just above the plane of the facial nerve anteriorly to make a tract for the gracilis. The dissection proceeded through the superficial parotid gland where the buccal branch of the facial nerve was identified. The remainder of the superficial parotid gland was removed in this area. A tunnel was made to the oral commissure and inferiorly and superiorly to it. Of note, over the zygomatic arch, we elevated in a more superficial plane to avoid injuring and to be above the zygomaticus muscles. Following this, there was good exposure for the gracilis. Due to the incomplete paralysis and partially functional facial nerve branches, there was increased complexity with the dissection and inset of the gracilis free flap. This required meticulous dissection and increased time of dissection.     Next. we performed the masseter nerve exploration. The temporomandibular joint and sigmoid notch were palpated. Approximately 1 cm posterior to the sigmoid notch, the masseter nerve exploration was undertaken. Dissection proceeded in the infratemporal fossa through the masseter muscle. Using a small S (Little) retractor, we serially elevated and feathered through the masseter muscle until the master nerve was identified. At this time, we marked it with a vessel loop. This was traced out and ligated distally for later  microvascular anastomosis. The masseter nerve was transected and transposed for easier access for later anastomosis. In order to improve the exposure of the nerve and prepare the wound bed for the gracilis, the anterior masseter muscle was resected. Additionally, the buccal fat was identified and carefully teased and resected.      Next, we explored the neck near the body of the mandible at the facial notch to find the facial vessels. The facial artery and vein were identified. They were cleaned and ligated with [place] clamps. There was strong flow to the facial artery.      Next, having all of our nerves and vessels prepared, we brought the gracilis up to the face. In order to improve the security of the stitches to the tip of the gracilis flap, we ran a 3-0 Vicryl suture from end to end to crate a pseudo-tendon-like anchor point for the inset stitches. The superior aspect of the muscle in the leg became the region that would be inset into the oral commissure. The flap was oriented so that the pedicle and nerve would come off inferiorly. This orientation was accomplished by functionally rotating the flap 180 degrees in both X and Y axis relative to orientation in the leg. At this time, 2-0 PDS sutures were used to inset the flap into the oral commissure. 5 stitches were used for this purpose. They were first inset into the orbicularis oris and deep tissue around the oral commissure to ensure that good stitches were secured and that the correct amount of pull and vector were what we were looking for. One stitch was inset at the oral commissure, 2 were inset above the oral commissure, and 1 was inset just below the oral commissure to give a correct pull of the lip. These were all secured with hemostats and numbered appropriately. These stitches were placed based upon the excursion of the patient's good size. Following this, the 2-0 PDS sutures that had been placed in the oral commissure were then placed with  appropriate spacing into the gracilis muscle. In order to minimize punching in this region, we placed these stitches equally far apart, and the tissues were inset into the muscle itself in a horizontal mattress fashion. These were then tied down tight to inset the flap.      Following this, we performed the microvascular anastomosis. The flap and the facial vessels were prepped and cleaned in a standard fashion. The microscope was brought in for microsurgical technique. We then performed a microvascular anastomosis between the flap artery and facial artery using 8-0 nylon sutures. Following this, we then performed a microvascular anastomosis between the facial vein and flap vein using a 3.0 mm [place] microvascular coupling device. Following this, the clamps were released. There was shown to be good flow through the flap. A [employer] implantable doppler was placed over the arterial anastomosis.      Following this, we then turned our attention to the neural anastomosis. The masseteric branch of the trigeminal nerve was anastomosed to the obturator nerve using 8-0 nylon sutures. Two sutures were placed for this purpose. Following suture placement, the nerve fibers themselves were trimmed so they approximated each other well.  Additionally, the CFNG was anastamosed to the obturator nerve in an end-to-end fashion in a similar fashion. Upon performing neural anastomoses, Tisseal fibrin glue was applied. Following this, the vein grafts was wrapped around the anastomosis and Tisseal was again applied.      We then continued our inset of the gracilis flap. Regarding the inferior vector, this was measured to be 10 cm preoperatively, and we also confirmed this by looking at the Vicryl stitches that were placed on the muscle in situ to verify that there was correct tension on this. This was measured out initially to be 10 cm and was confirmed so after inset. Of note, this was slightly overcorrected (by pulling slightly harder)  so that the stitches were approximately 2.2 cm apart as opposed to 2 to provide a little bit of pull and suspension at the oral commissure. This was inset into the inferior aspect of the posterior zygomatic arch and into the preauricular region over the area of the joint. Additionally, the superior vector measuring 4 cm was inset into the anterior zygoma. Next, the midface SMAS flap was suspended in a posterior-superior fashion for facial suspension. Following this, hemostasis was obtained. Wound was irrigated. In order to close the face and neck, the inferior skin flaps were sharply elevated and raised for a total of 30 square centimeters. The skin flaps were advanced and rotated and closed in multi- layered fashion with 3-0 vicryl and 5-0 fast gut and chromic sutures. The 10 Fr drain was placed immediately prior to closure.      The patient tolerated the procedure well. There were no complications. All instrument and sponge counts following the procedure were correct.         The patient was then returned to anesthesia, who transported him to the PACU in excellent condition.      Complications:  None; patient tolerated the procedure well.    Disposition: PACU - hemodynamically stable.  Condition: stable     Attending Attestation:   I was present for and performed all critical portions of the procedure.     Rubén Oh MD

## 2025-04-23 NOTE — CARE PLAN
The patient's goals for the shift include      The clinical goals for the shift include pain control, VSS

## 2025-04-23 NOTE — SIGNIFICANT EVENT
Rapid Response Nurse Note: RADAR alert: 7    Pager time: [phone]  Arrival time: 410  Event end time: 425  Location: [place] [address]  [x] Triage by phone or secure messaging    Rapid response initiated by:  [] Rapid response RN [] Family [] Nursing Supervisor [] Physician   [x] RADAR auto page [] Sepsis auto-page [] RN [] RT   [] NP/PA [] Other:     Primary reason for call:   [] BAT [] New CPAP/BiPAP [] Bleeding [] Change in mental status   [] Chest pain [] Code blue [] FiO2 >/= 50% [] HR </= 40 bpm   [] HR >/= 130 bpm [] Hyperglycemia [] Hypoglycemia [x] RADAR    [] RR </= 8 bpm [] RR >/= 30 bpm [] SBP </= 90 mmHg [] SpO2 < 90%   [] Seizure [] Sepsis [] Shortness of breath  [] Staff concern: see comments     Initial VS and/or RADAR VS: T 36.5 °C; HR 70; RR 7; BP 92/56; SPO2 96%.      Interventions:  [x] None [] ABG/VBG [] Assist w/ICU transfer [] BAT paged    [] Bag mask [] Blood [] Cardioversion [] Code Blue   [] Code blue for intubation [] Code status changed [] Chest x-ray [] EKG   [] IV fluid/bolus [] KUB x-ray [] Labs/cultures [] Medication   [] Nebulizer treatment [] NIPPV (CPAP/BiPAP) [] Oxygen [] Oral airway   [] Peripheral IV [] Palliative care consult [] CT/MRI [] Sepsis protocol    [] Suctioned [] Other:     Outcome:  [] Coded and  [] Code blue for intubation [] Coded and transferred to ICU []  on division   [x] Remained on division (no change) [] Remained on division + additional monitoring [] Remained in ED [] Transferred to ED   [] Transferred to ICU [] Transferred to inpatient status [] Transferred for interventions (procedure) [] Transferred to ICU stepdown    [] Transferred to surgery [] Transferred to telemetry [] Sepsis protocol [] STEMI protocol   [] Stroke protocol [x] Bedside nurse instructed to page rapid response for any concerns or acute change in condition/VS     Additional Comments: Reviewed above RADAR VS with bedside RN via phone.  Vital signs within patient's current trends.  Patient sleeping at this time and has sleep apnea. Patient easily arousable and when awake respirations are 16.  No acute change in condition.  No interventions by rapid response team indicated at this time.  Staff to page rapid response for any concerns or acute change in condition or VS.

## 2025-04-24 ENCOUNTER — PHARMACY VISIT (OUTPATIENT)
Dept: PHARMACY | Facility: CLINIC | Age: 50
End: 2025-04-24
Payer: COMMERCIAL

## 2025-04-24 VITALS
TEMPERATURE: 97.9 F | HEART RATE: 70 BPM | HEIGHT: 68 IN | BODY MASS INDEX: 25.49 KG/M2 | OXYGEN SATURATION: 96 % | SYSTOLIC BLOOD PRESSURE: 106 MMHG | DIASTOLIC BLOOD PRESSURE: 64 MMHG | WEIGHT: 168.21 LBS | RESPIRATION RATE: 12 BRPM

## 2025-04-24 LAB
ALBUMIN SERPL BCP-MCNC: 4.1 G/DL (ref 3.4–5)
ANION GAP SERPL CALC-SCNC: 11 MMOL/L (ref 10–20)
BUN SERPL-MCNC: 8 MG/DL (ref 6–23)
CALCIUM SERPL-MCNC: 8.3 MG/DL (ref 8.6–10.6)
CHLORIDE SERPL-SCNC: 106 MMOL/L (ref 98–107)
CO2 SERPL-SCNC: 27 MMOL/L (ref 21–32)
CREAT SERPL-MCNC: 0.7 MG/DL (ref 0.5–1.05)
EGFRCR SERPLBLD CKD-EPI 2021: >90 ML/MIN/1.73M*2
ERYTHROCYTE [DISTWIDTH] IN BLOOD BY AUTOMATED COUNT: 19.9 % (ref 11.5–14.5)
GLUCOSE SERPL-MCNC: 76 MG/DL (ref 74–99)
HCT VFR BLD AUTO: 32.1 % (ref 36–46)
HGB BLD-MCNC: 9.7 G/DL (ref 12–16)
MAGNESIUM SERPL-MCNC: 2.18 MG/DL (ref 1.6–2.4)
MCH RBC QN AUTO: 24.4 PG (ref 26–34)
MCHC RBC AUTO-ENTMCNC: 30.2 G/DL (ref 32–36)
MCV RBC AUTO: 81 FL (ref 80–100)
NRBC BLD-RTO: 0 /100 WBCS (ref 0–0)
PHOSPHATE SERPL-MCNC: 1.9 MG/DL (ref 2.5–4.9)
PLATELET # BLD AUTO: 300 X10*3/UL (ref 150–450)
POTASSIUM SERPL-SCNC: 4.2 MMOL/L (ref 3.5–5.3)
RBC # BLD AUTO: 3.97 X10*6/UL (ref 4–5.2)
SODIUM SERPL-SCNC: 140 MMOL/L (ref 136–145)
WBC # BLD AUTO: 8.5 X10*3/UL (ref 4.4–11.3)

## 2025-04-24 PROCEDURE — 80069 RENAL FUNCTION PANEL: CPT | Performed by: STUDENT IN AN ORGANIZED HEALTH CARE EDUCATION/TRAINING PROGRAM

## 2025-04-24 PROCEDURE — RXMED WILLOW AMBULATORY MEDICATION CHARGE

## 2025-04-24 PROCEDURE — 2500000004 HC RX 250 GENERAL PHARMACY W/ HCPCS (ALT 636 FOR OP/ED): Performed by: STUDENT IN AN ORGANIZED HEALTH CARE EDUCATION/TRAINING PROGRAM

## 2025-04-24 PROCEDURE — 85027 COMPLETE CBC AUTOMATED: CPT | Performed by: STUDENT IN AN ORGANIZED HEALTH CARE EDUCATION/TRAINING PROGRAM

## 2025-04-24 PROCEDURE — 83735 ASSAY OF MAGNESIUM: CPT | Performed by: STUDENT IN AN ORGANIZED HEALTH CARE EDUCATION/TRAINING PROGRAM

## 2025-04-24 PROCEDURE — 97161 PT EVAL LOW COMPLEX 20 MIN: CPT | Mod: GP

## 2025-04-24 PROCEDURE — 2500000001 HC RX 250 WO HCPCS SELF ADMINISTERED DRUGS (ALT 637 FOR MEDICARE OP): Performed by: STUDENT IN AN ORGANIZED HEALTH CARE EDUCATION/TRAINING PROGRAM

## 2025-04-24 PROCEDURE — 36415 COLL VENOUS BLD VENIPUNCTURE: CPT | Performed by: STUDENT IN AN ORGANIZED HEALTH CARE EDUCATION/TRAINING PROGRAM

## 2025-04-24 RX ADMIN — OXYCODONE 10 MG: 5 TABLET ORAL at 02:03

## 2025-04-24 RX ADMIN — HEPARIN SODIUM 5000 UNITS: 5000 INJECTION, SOLUTION INTRAVENOUS; SUBCUTANEOUS at 02:03

## 2025-04-24 RX ADMIN — HYDROGEN PEROXIDE 1 APPLICATION: 30 SOLUTION TOPICAL at 09:02

## 2025-04-24 RX ADMIN — PETROLATUM 1 APPLICATION: 1 OINTMENT TOPICAL at 09:02

## 2025-04-24 RX ADMIN — OXYCODONE 10 MG: 5 TABLET ORAL at 06:04

## 2025-04-24 RX ADMIN — ACETAMINOPHEN 975 MG: 325 TABLET, FILM COATED ORAL at 05:58

## 2025-04-24 RX ADMIN — AMOXICILLIN AND CLAVULANATE POTASSIUM 1 TABLET: 875; 125 TABLET, FILM COATED ORAL at 09:02

## 2025-04-24 RX ADMIN — OXYCODONE 10 MG: 5 TABLET ORAL at 10:12

## 2025-04-24 RX ADMIN — POLYETHYLENE GLYCOL 3350 17 G: 17 POWDER, FOR SOLUTION ORAL at 09:02

## 2025-04-24 RX ADMIN — HEPARIN SODIUM 5000 UNITS: 5000 INJECTION, SOLUTION INTRAVENOUS; SUBCUTANEOUS at 10:13

## 2025-04-24 ASSESSMENT — PAIN - FUNCTIONAL ASSESSMENT
PAIN_FUNCTIONAL_ASSESSMENT: 0-10

## 2025-04-24 ASSESSMENT — COGNITIVE AND FUNCTIONAL STATUS - GENERAL
MOBILITY SCORE: 24
MOVING TO AND FROM BED TO CHAIR: A LITTLE
DAILY ACTIVITIY SCORE: 24
CLIMB 3 TO 5 STEPS WITH RAILING: A LITTLE
WALKING IN HOSPITAL ROOM: A LITTLE
STANDING UP FROM CHAIR USING ARMS: A LITTLE
MOBILITY SCORE: 20

## 2025-04-24 ASSESSMENT — PAIN DESCRIPTION - LOCATION
LOCATION: FACE
LOCATION: FACE

## 2025-04-24 ASSESSMENT — ACTIVITIES OF DAILY LIVING (ADL): ADL_ASSISTANCE: INDEPENDENT

## 2025-04-24 ASSESSMENT — PAIN SCALES - GENERAL
PAINLEVEL_OUTOF10: 0 - NO PAIN
PAINLEVEL_OUTOF10: 7
PAINLEVEL_OUTOF10: 0 - NO PAIN
PAINLEVEL_OUTOF10: 0 - NO PAIN

## 2025-04-24 NOTE — PROGRESS NOTES
Otolaryngology - Head and Neck Surgery Progress Note    Subjective:  Headache overnight. Patient reports she normally drinks coffee and thinks the headache could be from caffeine deprivation.     Objective:  Scheduled medications  Scheduled Medications[1]  Continuous medications  Continuous Medications[2]  PRN medications  PRN Medications[3]    Recent Labs:  Results for orders placed or performed during the hospital encounter of 04/22/25 (from the past 24 hours)   POCT GLUCOSE   Result Value Ref Range    POCT Glucose 85 74 - 99 mg/dL         Physical Exam  Visit Vitals  /80   Pulse 74   Temp 36.7 °C (98.1 °F)   Resp 10     GENERAL: Appears well developed, well nourished, flap stable  RESPIRATION: Breathing comfortably on room air, no stridor  EYES: EOM Intact  NEURO: Awake and alert  HEAD AND FACE: Skin with no masses or lesions, incisions clean dry and intact without evidence of fluid collection or significant swelling  Flap: No skin paddle for monitoring, Doppler signal strong, incisions all clean dry and intact  EARS: Normal external ears  NOSE: External nose midline  ORAL CAVITY/OROPHARYNX/LIPS: Normal mucous membranes, normal floor of mouth/tongue/OP, no masses or lesions are noted  NECK/LYMPH: Small submental incision clean dry and intact  EXTR: Gracilis donor site covered with Telfa island dressing removed today, incision c/d/I   DRAINS: All drains with appropriate output    Assessment:  Bailey Zamora is a 49 y.o. 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 of Otolaryngology - Head and Neck Surgery, PGY-3  Personal pager: [phone]  ENT Consults: [phone]  ENT Overnight (5p-6a), and Weekends: [phone]  ENT Head and Neck Surgery Phone: [phone]  ENT Peds: [id number]  ENT Outpatient scheduling number: [phone]            [1] acetaminophen, 975 mg, oral, q8h ROGER   Or  acetaminophen, 1,000 mg, oral, q8h ROGER   Or  acetaminophen, 1,000 mg, g-tube, q8h ROGER  amoxicillin-clavulanate, 1 tablet, oral, q12h ROGER  bacitracin, , Topical, TID  heparin (porcine), 5,000 Units, subcutaneous, q8h  hydrogen peroxide, 1 Application, Topical, TID  pantoprazole, 40 mg, oral, Nightly  polyethylene glycol, 17 g, oral, Daily  sertraline, 50 mg, oral, Daily  white petrolatum, 1 Application, Topical, TID  [2]    [3] PRN medications: naloxone, ondansetron **OR** ondansetron, oxyCODONE, oxyCODONE

## 2025-04-24 NOTE — PROGRESS NOTES
04/24/25 1000   Discharge Planning   Living Arrangements Spouse/significant other   Support Systems Spouse/significant other   Assistance Needed none   Type of Residence Private residence   Who is requesting discharge planning? Provider   Home or Post Acute Services None   Expected Discharge Disposition Home     Bailey Zamora is a 49 y.o. female with Facial paralysis, who presented for facial reanimation with gracillis free tissue transfer to left face by Dr Muñoz on 4/22/2025. Patient had an uncomplicated surgical course. Patient recovered in PACU and was transferred to [address] for post-operative care. ADOD 4/24, HNN.     This SW/TCC met with pt to introduce self and role.  Per the medical team, this patient has no anticipated discharge needs; full assessment deferred at this time.  Please advise SW/TCC if discharge planning needs arise. Liz Garcia RN TCC

## 2025-04-24 NOTE — CARE PLAN
The patient's goals for the shift include      The clinical goals for the shift include pain control, VSS    Problem: Pain - Adult  Goal: Verbalizes/displays adequate comfort level or baseline comfort level  Outcome: Progressing     Problem: Safety - Adult  Goal: Free from fall injury  Outcome: Progressing     Problem: Discharge Planning  Goal: Discharge to home or other facility with appropriate resources  Outcome: Progressing     Problem: Chronic Conditions and Co-morbidities  Goal: Patient's chronic conditions and co-morbidity symptoms are monitored and maintained or improved  Outcome: Progressing     Problem: Nutrition  Goal: Nutrient intake appropriate for maintaining nutritional needs  Outcome: Progressing     Problem: Fall/Injury  Goal: Not fall by end of shift  Outcome: Progressing  Goal: Be free from injury by end of the shift  Outcome: Progressing  Goal: Verbalize understanding of personal risk factors for fall in the hospital  Outcome: Progressing  Goal: Verbalize understanding of risk factor reduction measures to prevent injury from fall in the home  Outcome: Progressing  Goal: Use assistive devices by end of the shift  Outcome: Progressing  Goal: Pace activities to prevent fatigue by end of the shift  Outcome: Progressing

## 2025-04-24 NOTE — PROGRESS NOTES
Physical Therapy    Physical Therapy Evaluation    Patient Name: Bailey Zamora  MRN: [id number]  Department: [facility]  Room: [address] [place] [address]  Today's Date: 4/24/2025   Time Calculation  Start Time: 1010  Stop Time: 1019  Time Calculation (min): 9 min    Assessment/Plan   PT Assessment  Rehab Prognosis: Good  Barriers to Discharge Home: No anticipated barriers  Evaluation/Treatment Tolerance: Patient tolerated treatment well  Medical Staff Made Aware: Yes  Strengths: Attitude of self  End of Session Communication: Bedside nurse  Assessment Comment: pt POD #2 for 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 pt grossly SBA without device. 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 alert and agreeable for PT  Home Living:  Home Living  Type of Home: House  Lives With: Spouse (3 kids; 2 dogs)  Home Adaptive Equipment: None  Home Layout: Two level, Bed/bath upstairs  Home Access: Stairs to enter without  rails  Entrance Stairs-Rails: Right  Entrance Stairs-Number of Steps: 2-3  Bathroom Shower/Tub: Walk-in shower  Bathroom Toilet: Standard  Bathroom Equipment: Shower chair with back  Prior Level of Function:  Prior Function Per Pt/Caregiver Report  Level of Esko: Independent with ADLs and functional transfers  ADL Assistance: Independent  Homemaking Assistance: Independent  Ambulatory Assistance: Independent  Vocational: Full time employment  Prior Function Comments: Denies fall hx  Precautions:  Precautions  Medical Precautions: Fall precautions  Precautions Comment: x2 JAIRO drains             Objective   Pain:  Pain Assessment  Pain Assessment: 0-10  0-10 (Numeric) Pain Score: 0 - No pain  Cognition:  Cognition  Overall Cognitive Status: Within Functional Limits  Orientation Level: Oriented X4    General Assessments:                  Activity Tolerance  Endurance: Tolerates 10 - 20 min exercise with multiple rests    Sensation  Light Touch:  (did not state)            Perception  Inattention/Neglect: Appears intact  Initiation: Appears intact  Motor Planning: Appears intact  Perseveration: Not present      Coordination  Movements are Fluid and Coordinated: Yes    Postural Control  Postural Control: Within Functional Limits    Static Sitting Balance  Static Sitting-Balance Support: No upper extremity supported, Feet supported  Static Sitting-Level of Assistance: Independent  Dynamic Sitting Balance  Dynamic Sitting-Balance Support: No upper extremity supported, Feet supported  Dynamic Sitting-Level of Assistance: Independent  Dynamic Sitting-Balance: Trunk control activities    Static Standing Balance  Static Standing-Balance Support: No upper extremity supported  Static Standing-Level of Assistance: Distant supervision  Dynamic Standing Balance  Dynamic Standing-Balance Support: No upper extremity supported  Dynamic Standing-Level of Assistance: Close supervision  Dynamic Standing-Balance:  Turning  Functional Assessments:  Bed Mobility  Bed Mobility: No (pt standing start of session; sitting EOB end of session)    Transfers  Transfer: Yes  Transfer 1  Transfer From 1: Sit to, Stand to  Transfer to 1: Sit, Stand  Technique 1: Sit to stand, Stand to sit  Transfer Device 1:  (none)  Transfer Level of Assistance 1: Distant supervision  Trials/Comments 1: x1    Ambulation/Gait Training  Ambulation/Gait Training Performed: Yes  Ambulation/Gait Training 1  Surface 1: Level tile  Device 1: No device  Assistance 1: Close supervision  Quality of Gait 1: Narrow base of support, Decreased step length, Antalgic  Comments/Distance (ft) 1: ~15ft    Stairs  Stairs:  (declined. reviewed stair training verbally with pt. no concerns)  Extremity/Trunk Assessments:  RLE   RLE : Within Functional Limits  LLE   LLE : Within Functional Limits  Outcome Measures:  [facility] Basic Mobility  Turning from your back to your side while in a flat bed without using bedrails: None  Moving from lying on your back to sitting on the side of a flat bed without using bedrails: None  Moving to and from bed to chair (including a wheelchair): A little  Standing up from a chair using your arms (e.g. wheelchair or bedside chair): A little  To walk in hospital room: A little  Climbing 3-5 steps with railing: A little  Basic Mobility - Total Score: 20    Encounter Problems       Encounter Problems (Active)       Pain - Adult              Education Documentation  Precautions, taught by Lilian Maciel PT at 4/24/2025 11:28 AM.  Learner: Patient  Readiness: Acceptance  Method: Explanation  Response: Verbalizes Understanding  Comment: stair training    Body Mechanics, taught by Lilian Maciel PT at 4/24/2025 11:28 AM.  Learner: Patient  Readiness: Acceptance  Method: Explanation  Response: Verbalizes Understanding  Comment: stair training    Mobility Training, taught by Lilian Maciel PT at 4/24/2025 11:28 AM.  Learner:  Patient  Readiness: Acceptance  Method: Explanation  Response: Verbalizes Understanding  Comment: stair training    Education Comments  No comments found.        04/24/25 at 11:29 AM - Lilian Maciel, PT

## 2025-04-24 NOTE — DISCHARGE INSTRUCTIONS
DEPARTMENT OF OTOLARYNGOLOGY (ENT)  DISCHARGE INSTRUCTIONS    -------------------------------------------------------------------------------------------------------------------    The following is a brief overview of your hospitalization. Some of the information contained on this summary may be confidential.  This information should be kept in your records and should be shared with your regular doctor.      PRINCIPAL DIAGNOSIS (reason after study for this admission): Facial Nerve Paralysis    Physicians:   Dr. Wilfred Muñoz    Operations performed while hospitalized:   gracilis free tissue transfer to left face    Treatment/wound care:   Keep area(s) clean and dry.   It is okay to shower 48 hours after time of surgery.    Do not scrub wound(s), pat dry.    Do not submerge wound(s) in standing water until seen in followup (no tub bathing, swimming, or hot tubs).    Please visually inspect your wound(s) at least once daily.  If the wound(s) are in a difficult to see location, please use a mirror or have someone else assist with visual inspection.    If you have sutures that you can see outside of the skin or staples:  Return sooner or call if wound(s) or surrounding area have increased swelling, pain, warmth, redness, or drainage that is thick, yellow and/or green.    Pain Control:    Adequate management:   Oxycodone can be taken as prescribed as needed for breakthrough pain.    Oxycodone is a narcotic, which can induce constipation.   Please take stool softeners when taking this medication to prevent constipation.    Activity after Discharge:   No heavy lifting, weight bearing as tolerated, no driving until mobility is returned to normal.   Do not submerge wound(s) in standing water for 7 days after surgery (no tub bathing, swimming, or hot tubs).   Do not drive or operate heavy machinery while taking narcotic pain medications as these medications can alter perception, impair judgement, and slow reaction  times.    Diet: Okay to advance to regular diet as tolerated    Call Provider if:  Breathing faster than normal  Breathing harder than normal or having retractions  Fever of 100.4 (38 C) or higher  Temperature is greater than 102 degrees  Chills  Drinking less than normal  Not being able to go 4-6 hours between albuterol treatments  Urinating less than normal, over 1 day  Acting very sleepy and difficult to awaken  Vomiting (throwing up) and not able to eat or drink for 12 hours  3 or more loose, watery bowel movements in 24 hours (diarrhea)  Any new concerning symptoms    Additional Instructions:     AVOID ANY CIRCUMFERENTIAL NECK PRESSURE. This can compromise the arterial and venous flow to the muscle flap.     How to care for your Ross-Franco drain:    When you leave the hospital, care for your Ross-Franco drain by:    Milking (stripping) your tubing to help move clots.    Emptying your drain 2 times a day. Do this once in the morning and once in the evening. Write down the amount of drainage on your Ross-Franco drainage log at the end of this resource. If you have more than 1 drain, measure and write down the drainage of each one separately. Do not add them together.    Caring for your insertion site.    Checking for problems.    Follow these instructions to empty your Ross-Franco drain:    Unplug the stopper on top of the bulb. This will make the bulb expand. Do not touch the inside of the stopper or the inner area of the opening on the bulb.     Turn the bulb upside down and gently squeeze it. Pour the drainage into the measuring container (see Figure 2).      Turn the bulb right side up. Squeeze the bulb until your fingers feel the palm of your hand. All the air should come out of the bulb.    Keep squeezing the bulb while you re-plug the stopper. Check to see that the bulb stays fully compressed to ensure a constant gentle suction. The stopper must be closed for the drain to work.    Attach the  drainage bulb to your surgical bra or wrap, if you’re wearing one. Use the plastic loop or Velcro® straps at the bottom. Do not let the drain dangle. It may be helpful to hold your drain in a flako pack or belt bag.    Check the amount and color of drainage in the measuring container. The first couple of days after surgery, the fluid may be a dark red color. This is normal. As you continue to heal, it may look pink or pale yellow.    Write down the amount (in mL) and color of your drainage on your Ross-Franco drainage log.    Flush the drainage down the toilet and rinse the measuring container with water.    At the end of each day, add the total amount of drainage you had for the day. Write the amount in the last column of the drainage log. If you have more than 1 drain, measure and record each one separately. Do not add them together.    Please keep a log of your drainage output and bring it to your follow-up visit with your surgeon.

## 2025-04-24 NOTE — NURSING NOTE
Patient discharged home, no home care needs. Rn demonstrated JAIRO drain care to patient and her spouse. RN removed Ivs and reviewed discharge instructions and follow up appointments with both as well. Meds to Beds brought to bedside by pharmacy. Taken home by spouse and taken to lobby by transport.     Carmen Mccauley RN

## 2025-04-24 NOTE — PROGRESS NOTES
Facial Plastic & Reconstructive Surgery      POV s/p 4/22/25 GRACILIS FREE FLAP WITH FACIAL SUSPENSION     Doing well, endorses improved swelling and pain  Continues surgical site incision care and ointment to sites    Expected postop swelling  Neck drain removed without issue  Leg drain remains as putting out >80cc daily  Incisions c/d/I    Plan:  Continue surgical site incision care as directed  RTC in one wk or sooner for drain     Bruce Hill PA-C

## 2025-04-24 NOTE — DISCHARGE SUMMARY
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 female with Facial paralysis, who presented for facial reanimation with gracillis free tissue transfer to left face by Dr Muñoz on 4/22/2025. Patient had an uncomplicated surgical course. Patient recovered in PACU and was transferred to [address] for post-operative care.    Patient post-operative course was uncomplicated. Escamilla was removed on postoperative day 1. She was initially on a PCA which was discontinued as pain was generally well controlled. Surgical drain output was monitored and drains were removed as output decreased to an appropriate level. IV fluids were stopped as patient was tolerating oral intake without concern.    On day of discharge, post-operative pain was well controlled with enteral pain medication, breathing on room air, voiding spontaneously ambulating well, and was tolerating a diet. Follow-up arranged.      Pertinent Physical Exam At Time of Discharge  Visit Vitals  /80   Pulse 74   Temp 36.7 °C (98.1 °F)   Resp 10      GENERAL: Appears well developed, well nourished, flap stable  RESPIRATION: Breathing comfortably on room air, no stridor  EYES: EOM Intact  NEURO: Awake and alert  HEAD AND FACE: Skin with no masses or lesions, incisions clean dry and intact without evidence of fluid collection or significant swelling  Flap: No skin paddle for monitoring, Doppler signal strong, incisions all clean dry and intact  EARS: Normal external ears  NOSE: External nose midline  ORAL  CAVITY/OROPHARYNX/LIPS: Normal mucous membranes, normal floor of mouth/tongue/OP, no masses or lesions are noted  NECK/LYMPH: Small submental incision clean dry and intact  EXTR: Gracilis donor site covered with Telfa island dressing removed today, incision c/d/I    DRAINS: All drains with appropriate output         Home Medications     Medication List      START taking these medications     amoxicillin-clavulanate 875-125 mg tablet; Commonly known as: Augmentin;   Take 1 tablet by mouth 2 times a day for 7 days.   ondansetron ODT 4 mg disintegrating tablet; Commonly known as:   Zofran-ODT; Dissolve 1 tablet (4 mg) in the mouth every 8 hours if needed   for nausea or vomiting for up to 3 days.   oxyCODONE 5 mg immediate release tablet; Commonly known as: Roxicodone;   Take 1 tablet (5 mg) by mouth every 6 hours if needed for severe pain (7 -   10) for up to 5 days.   sennosides-docusate sodium 8.6-50 mg tablet; Commonly known as:   Annel-Colace; Take 1 tablet by mouth once daily for 10 days.     CONTINUE taking these medications     gabapentin 100 mg capsule; Commonly known as: Neurontin; Take 1 capsule   (100 mg) by mouth once daily at bedtime.   pantoprazole 40 mg EC tablet; Commonly known as: ProtoNix   sertraline 50 mg tablet; Commonly known as: Zoloft     ASK your doctor about these medications     bacitracin 500 unit/gram ointment; Apply topically to external incisions   2 times a day.   diclofenac sodium 3 % gel; Apply topically 2 times a day.   honey gel topical gel; Commonly known as: Medihoney; Apply 1 Application   topically if needed for wound care.       Outpatient Follow-Up  Future Appointments   Date Time Provider Department Center   4/30/2025  9:15 AM Bruce Hill PA-C [id number]       Moshe Fiore MD   English

## 2025-04-24 NOTE — HOSPITAL COURSE
Bailey Zamora is a 49 y.o. female with Facial paralysis, who presented for facial reanimation with gracillis free tissue transfer to left face by Dr Muñoz on 4/22/2025. Patient had an uncomplicated surgical course. Patient recovered in PACU and was transferred to [address] for post-operative care.    Patient post-operative course was uncomplicated. Escamilla was removed on postoperative day 1. She was initially on a PCA which was discontinued as pain was generally well controlled. Surgical drain output was monitored and drains were removed as output decreased to an appropriate level. IV fluids were stopped as patient was tolerating oral intake without concern.    On day of discharge, post-operative pain was well controlled with enteral pain medication, breathing on room air, voiding spontaneously ambulating well, and was tolerating a diet. Follow-up arranged.

## 2025-04-29 LAB
LABORATORY COMMENT REPORT: NORMAL
PATH REPORT.FINAL DX SPEC: NORMAL
PATH REPORT.GROSS SPEC: NORMAL
PATH REPORT.RELEVANT HX SPEC: NORMAL
PATH REPORT.TOTAL CANCER: NORMAL

## 2025-04-30 ENCOUNTER — APPOINTMENT (OUTPATIENT)
Dept: OTOLARYNGOLOGY | Facility: CLINIC | Age: 50
End: 2025-04-30
Payer: COMMERCIAL

## 2025-04-30 VITALS — HEIGHT: 67 IN | BODY MASS INDEX: 26.24 KG/M2 | WEIGHT: 167.2 LBS

## 2025-04-30 DIAGNOSIS — G51.0 FACIAL PARALYSIS: ICD-10-CM

## 2025-04-30 PROCEDURE — 99024 POSTOP FOLLOW-UP VISIT: CPT | Performed by: PHYSICIAN ASSISTANT

## 2025-04-30 PROCEDURE — 3008F BODY MASS INDEX DOCD: CPT | Performed by: PHYSICIAN ASSISTANT

## 2025-04-30 RX ORDER — BACITRACIN ZINC 500 UNIT/G
OINTMENT (GRAM) TOPICAL 3 TIMES DAILY
Qty: 113 G | Refills: 3 | Status: SHIPPED | OUTPATIENT
Start: 2025-04-30 | End: 2025-06-29

## 2025-04-30 ASSESSMENT — PAIN SCALES - GENERAL: PAINLEVEL_OUTOF10: 5

## 2025-05-02 NOTE — PROGRESS NOTES
Facial Plastic & Reconstructive Surgery      POV s/p 4/22/25 GRACILIS FREE FLAP WITH FACIAL SUSPENSION     Doing well, endorses improved swelling and pain  Continues surgical site incision care and ointment to sites    Expected postop swelling  Neck drain removed without issue  Leg drain remains as putting out >80cc daily  Incisions c/d/I    Plan:  Continue surgical site incision care as directed  RTC in one wk or sooner for drain     Bruce Hill PA-C     Spine appears normal, range of motion is not limited, no muscle or joint tenderness

## 2025-05-05 DIAGNOSIS — T81.49XA INCISIONAL INFECTION: ICD-10-CM

## 2025-05-05 RX ORDER — DOXYCYCLINE 100 MG/1
100 CAPSULE ORAL 2 TIMES DAILY
Qty: 28 CAPSULE | Refills: 0 | Status: SHIPPED | OUTPATIENT
Start: 2025-05-05 | End: 2025-05-19

## 2025-05-07 ENCOUNTER — APPOINTMENT (OUTPATIENT)
Dept: OTOLARYNGOLOGY | Facility: CLINIC | Age: 50
End: 2025-05-07
Payer: COMMERCIAL

## 2025-05-16 PROBLEM — M79.2 NEUROPATHIC PAIN: Status: ACTIVE | Noted: 2025-05-16

## 2025-05-16 PROBLEM — G93.89 BRAIN MASS: Status: ACTIVE | Noted: 2025-05-16

## 2025-05-16 PROBLEM — G93.5: Status: ACTIVE | Noted: 2025-05-16

## 2025-06-02 NOTE — PROGRESS NOTES
Facial Plastic & Reconstructive Surgery    6/4/25  POV s/p 4/22/25 GRACILIS FREE FLAP WITH FACIAL SUSPENSION     Doing well, endorses improved swelling and pain  Continues surgical site incision care and ointment to sites  Concerned about bump along cheek surgical site    Postop swelling improved  Good smile excursion  Incisions c/d/I    Plan:  Massages at bilateral jaw and neck recommended  Recommend neurologist to assess swelling/inflammation of toes; if not conclusive, recommend dermatology.  RTC in one month for FUV    Bruce Hill PA-C

## 2025-06-04 ENCOUNTER — APPOINTMENT (OUTPATIENT)
Dept: OTOLARYNGOLOGY | Facility: CLINIC | Age: 50
End: 2025-06-04
Payer: COMMERCIAL

## 2025-06-04 VITALS — HEIGHT: 68 IN | BODY MASS INDEX: 24.71 KG/M2 | WEIGHT: 163 LBS

## 2025-06-04 DIAGNOSIS — G51.9 ABNORMALITY OF FACIAL NERVE: ICD-10-CM

## 2025-06-04 DIAGNOSIS — G51.0 FACIAL PARALYSIS: Primary | ICD-10-CM

## 2025-06-04 DIAGNOSIS — M79.605 PAIN OF LEFT LOWER EXTREMITY: ICD-10-CM

## 2025-06-04 DIAGNOSIS — M95.2 ACQUIRED DEFORMITY OF HEAD: ICD-10-CM

## 2025-06-04 PROCEDURE — 3008F BODY MASS INDEX DOCD: CPT | Performed by: PHYSICIAN ASSISTANT

## 2025-06-04 PROCEDURE — 1036F TOBACCO NON-USER: CPT | Performed by: PHYSICIAN ASSISTANT

## 2025-06-04 PROCEDURE — 99024 POSTOP FOLLOW-UP VISIT: CPT | Performed by: PHYSICIAN ASSISTANT

## 2025-06-04 ASSESSMENT — PATIENT HEALTH QUESTIONNAIRE - PHQ9
SUM OF ALL RESPONSES TO PHQ9 QUESTIONS 1 AND 2: 0
1. LITTLE INTEREST OR PLEASURE IN DOING THINGS: NOT AT ALL
2. FEELING DOWN, DEPRESSED OR HOPELESS: NOT AT ALL

## 2025-07-16 ENCOUNTER — APPOINTMENT (OUTPATIENT)
Dept: OTOLARYNGOLOGY | Facility: CLINIC | Age: 50
End: 2025-07-16
Payer: COMMERCIAL

## 2025-07-16 VITALS — HEIGHT: 67 IN | WEIGHT: 158 LBS | BODY MASS INDEX: 24.8 KG/M2

## 2025-07-16 DIAGNOSIS — G24.5 BLEPHAROSPASM: ICD-10-CM

## 2025-07-16 DIAGNOSIS — M95.2 ACQUIRED DEFORMITY OF HEAD: ICD-10-CM

## 2025-07-16 DIAGNOSIS — G51.0 FACIAL PARALYSIS: Primary | ICD-10-CM

## 2025-07-16 DIAGNOSIS — G51.9 ABNORMALITY OF FACIAL NERVE: ICD-10-CM

## 2025-07-16 DIAGNOSIS — M79.605 PAIN OF LEFT LOWER EXTREMITY: ICD-10-CM

## 2025-07-16 DIAGNOSIS — R25.8 SYNKINESIA: ICD-10-CM

## 2025-07-16 PROCEDURE — 99024 POSTOP FOLLOW-UP VISIT: CPT | Performed by: PHYSICIAN ASSISTANT

## 2025-07-16 PROCEDURE — 3008F BODY MASS INDEX DOCD: CPT | Performed by: PHYSICIAN ASSISTANT

## 2025-07-16 PROCEDURE — 1036F TOBACCO NON-USER: CPT | Performed by: PHYSICIAN ASSISTANT

## 2025-07-16 RX ORDER — PREGABALIN 25 MG/1
25 CAPSULE ORAL 2 TIMES DAILY
COMMUNITY

## 2025-07-16 NOTE — PROGRESS NOTES
Facial Plastic & Reconstructive Surgery    7/16/25  POV s/p 4/22/25 GRACILIS FREE FLAP WITH FACIAL SUSPENSION     Doing well, endorses improved swelling and pain  Noting new sensation along left face sometimes goes to right face    Postop swelling improved  Good smile excursion  Incisions c/d/I  Left lower lid blepharospam and chin dimpling      Plan:  Massages at bilateral jaw and neck recommended  Recommend jaw bra for facial swelling  Dr. Muñoz to perform botox for patient in near future    Bruce Hill PA-C

## 2025-08-14 ENCOUNTER — APPOINTMENT (OUTPATIENT)
Facility: CLINIC | Age: 50
End: 2025-08-14
Payer: COMMERCIAL

## 2025-08-14 DIAGNOSIS — M95.2 ACQUIRED DEFORMITY OF HEAD: ICD-10-CM

## 2025-08-14 DIAGNOSIS — G51.0 FACIAL PARALYSIS: ICD-10-CM

## 2025-08-14 DIAGNOSIS — G51.9 ABNORMALITY OF FACIAL NERVE: ICD-10-CM

## 2025-08-14 DIAGNOSIS — G51.9 DISORDER OF SEVENTH CRANIAL NERVE: ICD-10-CM

## 2025-08-14 DIAGNOSIS — R25.8 SYNKINESIA: ICD-10-CM

## 2025-08-14 PROCEDURE — 64612 DESTROY NERVE FACE MUSCLE: CPT | Performed by: PHYSICIAN ASSISTANT

## 2025-08-14 ASSESSMENT — PAIN SCALES - GENERAL: PAINLEVEL_OUTOF10: 0-NO PAIN

## 2025-08-15 ENCOUNTER — TELEPHONE (OUTPATIENT)
Facility: CLINIC | Age: 50
End: 2025-08-15
Payer: COMMERCIAL

## 2025-09-11 ENCOUNTER — APPOINTMENT (OUTPATIENT)
Facility: CLINIC | Age: 50
End: 2025-09-11
Payer: COMMERCIAL

## 2025-11-14 ENCOUNTER — APPOINTMENT (OUTPATIENT)
Facility: CLINIC | Age: 50
End: 2025-11-14
Payer: COMMERCIAL

## (undated) DEVICE — DRESSING, TRANSPARENT, TEGADERM, 2-3/8 X 2-3/4 IN

## (undated) DEVICE — CATHETER, IV, INSYTE, AUTOGUARD, SHIELDED, 16 G X 1.75 IN, VIALON

## (undated) DEVICE — SYRINGE, 10 CC, LUER LOCK

## (undated) DEVICE — SUTURE, PLAIN, 5-0, 18 IN, PC1, YELLOW

## (undated) DEVICE — APPLIER,  LIGACLIP MULTI CLIP, 30 MED 11 1/2

## (undated) DEVICE — STIMULATOR, NERVE, GEMINI BIPOLAR

## (undated) DEVICE — Device

## (undated) DEVICE — SUTURE, SILK, 3-0, 30 IN, BR SH, BLACK

## (undated) DEVICE — PROTECTOR, CORNEAL, CROUCH

## (undated) DEVICE — CORD, CAUTERY, BIOPOLAR FORCEP, 12FT

## (undated) DEVICE — SYRINGE, COLLECTION, ABG, 1CC, LUER LOCK

## (undated) DEVICE — DRAIN, WOUND, FLAT, HUBLESS, FULL LENGTH PERFORATION, 10 MM X 20 CM, SILICONE

## (undated) DEVICE — SUTURE, ETHILON, 8-0, 5 IN, BV1305, BLACK

## (undated) DEVICE — SHEARS, CURVED 9CM HARMONIC FOCUS

## (undated) DEVICE — CATHETER, IV, INSYTE, AUTOGUARD, SHIELDED, 16 G X 1.16 IN, VIALON

## (undated) DEVICE — MANIFOLD, 4 PORT NEPTUNE STANDARD

## (undated) DEVICE — DEPRESSOR, TONGUE, 6 IN, WOOD, STERILE, LF

## (undated) DEVICE — DRAIN, PENROSE, 0.5 X 12 IN, LATEX, STERILE

## (undated) DEVICE — COUNTER, NEEDLE, FOAM BLOCK, W/MAGNET, W/BLADE GUARD, 10 COUNT, RED, LF

## (undated) DEVICE — 12MM MEDTRONIC PAIRED SUBDERMAL ELECTRODE, 2-CHANNEL, 12.0MM***DUPLICATE, PLEASE TRANSITION TO CAT #8227410

## (undated) DEVICE — DRESSING, MEPILEX, BORDER, SACRUM, 8.7 X 9.8 IN

## (undated) DEVICE — COVER, BACK TABLE, 65 X 90, HVY REINFORCED

## (undated) DEVICE — SPEAR, EYE, SURGICAL, WECK-CEL, CELLULOSE

## (undated) DEVICE — EVACUATOR, WOUND, SUCTION, CLOSED, JACKSON-PRATT, 100 CC, SILICONE

## (undated) DEVICE — RESERVOIR, DRAINAGE, WOUND, JACKSON-PRATT, 100 CC, SILICONE

## (undated) DEVICE — SUTURE, PROLENE, 4-0, 18 IN, P3, BLUE

## (undated) DEVICE — STRIP, SKIN CLOSURE, STERI STRIP, REINFORCED, 0.5 X 4 IN

## (undated) DEVICE — DRESSING, GAUZE, SUPER KERLIX, 6X6

## (undated) DEVICE — BANDAGE, GAUZE, 6 PLY, KERLIX, 3.5 IN X 3.5 YD, STERILE

## (undated) DEVICE — HOLSTER, JET SAFETY

## (undated) DEVICE — SYRINGE, 6 CC, REG LUER TIP

## (undated) DEVICE — NEEDLE, FILTER 19 G X 1 IN

## (undated) DEVICE — BANDAGE, ELASTIC, COMPRESSION, W/REMOVABLE CLIP, 3 IN X 4.5 YD

## (undated) DEVICE — DRESSING, MEPILEX, BORDER, HEEL, 8.7 X 9.1 IN

## (undated) DEVICE — MICROCLIPS, GEM HEMOSTATIC TITANIUM

## (undated) DEVICE — GEL, ECOVUE, ULTRASOUND, 20GRAM, STERILE

## (undated) DEVICE — GLOVE, SURGICAL, PROTEXIS PI BLUE W/NEUTHERA, 7.5, PF, LF

## (undated) DEVICE — APPLIER, LIGACLIP, MULTIPLE, SMALL 9-3/8IN

## (undated) DEVICE — NEEDLE, HYPODERMIC, 25 G X 1.5 IN, A BEVEL, STERILE

## (undated) DEVICE — DRAIN, PENROSE, 0.25 X 18 IN, LATEX, STERILE

## (undated) DEVICE — TISSEEL FIBRIN SEALANT, PRIMA, FROZEN, 4ML

## (undated) DEVICE — RETRACTOR, CORDLESS, RADIALUX, LIGHTED

## (undated) DEVICE — SPONGE GAUZE, XRAY SC+RFID, 4X4 16 PLY, STERILE

## (undated) DEVICE — SPONGE, GAUZE, XRAY DECT, 16 PLY, 4 X 4, W/MASTER DMT,STERILE

## (undated) DEVICE — CATHETER, IV, ANGIOCATH, 20 G X 1.16 IN, FEP POLYMER

## (undated) DEVICE — BLADE, CAUTERY, PEAK PLASMA

## (undated) DEVICE — STIMULATOR, NERVE LOCATOR, HEAD&NECK

## (undated) DEVICE — NEEDLE, HYPODERMIC, REGULAR WALL, REGULAR BEVEL, 18 G X 1.5 IN

## (undated) DEVICE — NEEDLE, HYPODERMIC, MONOJECT, 27 G X 1.5 IN

## (undated) DEVICE — MARKER, SKIN, RULER AND LABEL PACK, CUSTOM

## (undated) DEVICE — BACKGROUND MATERIAL, MERCIAN, YELLOW, 1MM GRID, LF

## (undated) DEVICE — CLEANER, WIPE, INSTRUMENT, 3.25 X 3.25 IN

## (undated) DEVICE — VESSEL LOOP, RED MAXI

## (undated) DEVICE — WAX, BONE, 2.5 GM

## (undated) DEVICE — LOOP, VESSEL, MINI, WHITE

## (undated) DEVICE — BLADE, RAZOR, CUSTOM, STERILE

## (undated) DEVICE — CATHETER, IV, ANGIOCATH, 24 G X 0.75 IN, FEP POLYMER

## (undated) DEVICE — STAY SET, SURGICAL, 5MM SHARP HOOK, 8PK

## (undated) DEVICE — INSTRUMENT, SUCTION, FRAZIER, 8 FR, W/VENT